# Patient Record
Sex: MALE | Race: WHITE | NOT HISPANIC OR LATINO | URBAN - METROPOLITAN AREA
[De-identification: names, ages, dates, MRNs, and addresses within clinical notes are randomized per-mention and may not be internally consistent; named-entity substitution may affect disease eponyms.]

---

## 2018-09-04 ENCOUNTER — OUTPATIENT (OUTPATIENT)
Dept: OUTPATIENT SERVICES | Facility: HOSPITAL | Age: 58
LOS: 1 days | End: 2018-09-04
Payer: COMMERCIAL

## 2018-09-04 ENCOUNTER — APPOINTMENT (OUTPATIENT)
Dept: MRI IMAGING | Facility: IMAGING CENTER | Age: 58
End: 2018-09-04
Payer: COMMERCIAL

## 2018-09-04 DIAGNOSIS — Z00.8 ENCOUNTER FOR OTHER GENERAL EXAMINATION: ICD-10-CM

## 2018-09-04 PROCEDURE — 73221 MRI JOINT UPR EXTREM W/O DYE: CPT

## 2018-09-04 PROCEDURE — 73221 MRI JOINT UPR EXTREM W/O DYE: CPT | Mod: 26,RT

## 2018-11-01 ENCOUNTER — OUTPATIENT (OUTPATIENT)
Dept: OUTPATIENT SERVICES | Facility: HOSPITAL | Age: 58
LOS: 1 days | End: 2018-11-01
Payer: COMMERCIAL

## 2018-11-01 ENCOUNTER — APPOINTMENT (OUTPATIENT)
Dept: CT IMAGING | Facility: IMAGING CENTER | Age: 58
End: 2018-11-01

## 2018-11-01 DIAGNOSIS — Z00.8 ENCOUNTER FOR OTHER GENERAL EXAMINATION: ICD-10-CM

## 2018-11-01 PROCEDURE — G0297: CPT

## 2018-11-01 PROCEDURE — G0297: CPT | Mod: 26

## 2018-12-05 ENCOUNTER — APPOINTMENT (OUTPATIENT)
Dept: OTOLARYNGOLOGY | Facility: CLINIC | Age: 58
End: 2018-12-05
Payer: COMMERCIAL

## 2018-12-05 VITALS
BODY MASS INDEX: 30.48 KG/M2 | HEIGHT: 72 IN | SYSTOLIC BLOOD PRESSURE: 132 MMHG | WEIGHT: 225 LBS | DIASTOLIC BLOOD PRESSURE: 83 MMHG | HEART RATE: 77 BPM

## 2018-12-05 PROCEDURE — 31231 NASAL ENDOSCOPY DX: CPT

## 2018-12-05 PROCEDURE — G0268 REMOVAL OF IMPACTED WAX MD: CPT

## 2018-12-05 PROCEDURE — 92557 COMPREHENSIVE HEARING TEST: CPT

## 2018-12-05 PROCEDURE — 99204 OFFICE O/P NEW MOD 45 MIN: CPT | Mod: 25

## 2018-12-05 PROCEDURE — 92550 TYMPANOMETRY & REFLEX THRESH: CPT

## 2019-10-21 ENCOUNTER — APPOINTMENT (OUTPATIENT)
Dept: UROLOGY | Facility: CLINIC | Age: 59
End: 2019-10-21
Payer: COMMERCIAL

## 2019-10-21 PROCEDURE — 99244 OFF/OP CNSLTJ NEW/EST MOD 40: CPT

## 2019-10-21 NOTE — ASSESSMENT
[FreeTextEntry1] : 10/21/19: Michael Canela is a 59 y/o male patient who presents today for an initial evaluation. For the past 4-5 years he has been seeing various urologists. He began taking Tamsulosin 0.4 mg BID to improve his urination. He became concerned when the amount of ejaculate decreased and as a result the Tamsulosin was decreased to one capsule per day. Urinary frequency is reported. He states that the stream is strong. Nocturia x 2 is reported. When he wakes up at night he states that the urinary stream is weaker than it is during the day. He believes that his urination is significantly improved since beginning the Tamsulosin. He states that his sexual function is decreased, erections occur less often. He is a  and drinks at least 2 quarts of water a day. His father was diagnosed with prostate cancer in his 70's. His father also had colon cancer. His mother was diagnosed with breast cancer. He denies a Hx and FHx of kidney stones. Currently smokes less than half a pack of cigarettes a day and is attempting to quit.\par \par Digital rectal exam found no suspicious rectal masses. Anal tone is normal. The prostate is non tender with normal texture, discrete borders and no nodules. It is a 30 gram transurethral resection size. \par \par The patient produced a urine sample which will be sent for urinalysis, urine cytology and urine culture. \par Blood work today included comprehensive metabolic panel, CBC, PSA and testosterone. \par \par Tamsulosin 0.4 mg is renewed at this time. He is to continue taking one capsule once a day as directed. \par \par He is to follow up in 6 months or sooner if clinically indicated.

## 2019-10-21 NOTE — REVIEW OF SYSTEMS
[Feeling Tired] : feeling tired [Cough] : cough [Diarrhea] : diarrhea [Feelings Of Weakness] : feelings of weakness [Joint Pain] : joint pain [Negative] : Heme/Lymph [Chest Pain] : no chest pain [Abdominal Pain] : no abdominal pain [Constipation] : no constipation

## 2019-10-21 NOTE — LETTER BODY
[Consult Letter:] : I had the pleasure of evaluating your patient, [unfilled]. [Please see my note below.] : Please see my note below. [Dear  ___] : Dear  [unfilled], [Sincerely,] : Sincerely, [Consult Closing:] : Thank you very much for allowing me to participate in the care of this patient.  If you have any questions, please do not hesitate to contact me. [FreeTextEntry2] : Jonny Abel MD\par [address], \[place] [address] [FreeTextEntry1] : 10/21/19: Michael Canela is a 57 y/o male patient who presents today for an initial evaluation. For the past 4-5 years he has been seeing various urologists. He began taking Tamsulosin 0.4 mg BID to improve his urination. He became concerned when the amount of ejaculate decreased and as a result the Tamsulosin was decreased to one capsule per day. Urinary frequency is reported. He states that the stream is strong. Nocturia x 2 is reported. When he wakes up at night he states that the urinary stream is weaker than it is during the day. He believes that his urination is significantly improved since beginning the Tamsulosin. He states that his sexual function is decreased, erections occur less often. He is a  and drinks at least 2 quarts of water a day. His father was diagnosed with prostate cancer in his 70's. His father also had colon cancer. His mother was diagnosed with breast cancer. He denies a Hx and FHx of kidney stones. Currently smokes less than half a pack of cigarettes a day and is attempting to quit.\par \par Digital rectal exam found no suspicious rectal masses. Anal tone is normal. The prostate is non tender with normal texture, discrete borders and no nodules. It is a 30 gram transurethral resection size. \par \par The patient produced a urine sample which will be sent for urinalysis, urine cytology and urine culture. \par Blood work today included comprehensive metabolic panel, CBC, PSA and testosterone. \par \par Tamsulosin 0.4 mg is renewed at this time. He is to continue taking one capsule once a day as directed. \par \par He is to follow up in 6 months or sooner if clinically indicated.  [FreeTextEntry3] : Chris Leggett M.D. PhD

## 2019-10-21 NOTE — HISTORY OF PRESENT ILLNESS
[FreeTextEntry1] : 10/21/19: Michael Canela is a 57 y/o male patient who presents today for an initial evaluation. For the past 4-5 years he has been seeing various urologists. He began taking Tamsulosin 0.4 mg BID to improve his urination. He became concerned when the amount of ejaculate decreased and as a result the Tamsulosin was decreased to one capsule per day. Urinary frequency is reported. He states that the stream is strong. Nocturia x 2 is reported. When he wakes up at night he states that the urinary stream is weaker than it is during the day. He believes that his urination is significantly improved since beginning the Tamsulosin. He states that his sexual function is decreased, erections occur less often. He is a  and drinks at least 2 quarts of water a day. His father was diagnosed with prostate cancer in his 70's. His father also had colon cancer. His mother was diagnosed with breast cancer. He denies a Hx and FHx of kidney stones. Currently smokes less than half a pack of cigarettes a day and is attempting to quit.

## 2019-10-21 NOTE — ADDENDUM
[FreeTextEntry1] : This note was authored by Shai Christensen working as scribe for Dr. Chris Leggett. I, Dr. Chris Leggett, have reviewed the content of this note and confirm it is true and accurate. I personally performed the history and physical examination and made all the decisions.\par 10/21/19

## 2019-10-21 NOTE — PHYSICAL EXAM
[Normal Appearance] : normal appearance [General Appearance - Well Developed] : well developed [] : no respiratory distress [Oriented To Time, Place, And Person] : oriented to person, place, and time [Normal Station and Gait] : the gait and station were normal for the patient's age [Affect] : the affect was normal [Edema] : no peripheral edema [Heart Rate And Rhythm] : Heart rate and rhythm were normal [Abdomen Tenderness] : non-tender [Abdomen Soft] : soft [Costovertebral Angle Tenderness] : no ~M costovertebral angle tenderness [Skin Color & Pigmentation] : normal skin color and pigmentation [No Palpable Adenopathy] : no palpable adenopathy [No Focal Deficits] : no focal deficits [Cervical Lymph Nodes Enlarged Anterior Bilaterally] : anterior cervical [Cervical Lymph Nodes Enlarged Posterior Bilaterally] : posterior cervical [FreeTextEntry1] : Digital rectal exam found no suspicious rectal masses. Anal tone is normal. The prostate is non tender with normal texture, discrete borders and no nodules. It is a 30 gram transurethral resection size.

## 2019-11-06 LAB
ALBUMIN SERPL ELPH-MCNC: 4.5 G/DL
ALP BLD-CCNC: 88 U/L
ALT SERPL-CCNC: 32 U/L
ANION GAP SERPL CALC-SCNC: 13 MMOL/L
APPEARANCE: CLEAR
AST SERPL-CCNC: 23 U/L
BACTERIA UR CULT: NORMAL
BACTERIA: NEGATIVE
BASOPHILS # BLD AUTO: 0.05 K/UL
BASOPHILS NFR BLD AUTO: 0.9 %
BILIRUB SERPL-MCNC: 0.4 MG/DL
BILIRUBIN URINE: NEGATIVE
BLOOD URINE: NEGATIVE
BUN SERPL-MCNC: 17 MG/DL
CALCIUM SERPL-MCNC: 10 MG/DL
CHLORIDE SERPL-SCNC: 101 MMOL/L
CO2 SERPL-SCNC: 26 MMOL/L
COLOR: YELLOW
CREAT SERPL-MCNC: 0.82 MG/DL
EOSINOPHIL # BLD AUTO: 0.26 K/UL
EOSINOPHIL NFR BLD AUTO: 4.5 %
GLUCOSE QUALITATIVE U: NEGATIVE
GLUCOSE SERPL-MCNC: 100 MG/DL
HCT VFR BLD CALC: 45.5 %
HGB BLD-MCNC: 14.4 G/DL
HYALINE CASTS: 0 /LPF
IMM GRANULOCYTES NFR BLD AUTO: 0.2 %
KETONES URINE: NEGATIVE
LEUKOCYTE ESTERASE URINE: NEGATIVE
LYMPHOCYTES # BLD AUTO: 2.15 K/UL
LYMPHOCYTES NFR BLD AUTO: 37.6 %
MAN DIFF?: NORMAL
MCHC RBC-ENTMCNC: 29.9 PG
MCHC RBC-ENTMCNC: 31.6 GM/DL
MCV RBC AUTO: 94.6 FL
MICROSCOPIC-UA: NORMAL
MONOCYTES # BLD AUTO: 0.41 K/UL
MONOCYTES NFR BLD AUTO: 7.2 %
NEUTROPHILS # BLD AUTO: 2.84 K/UL
NEUTROPHILS NFR BLD AUTO: 49.6 %
NITRITE URINE: NEGATIVE
PH URINE: 6
PLATELET # BLD AUTO: 209 K/UL
POTASSIUM SERPL-SCNC: 5 MMOL/L
PROT SERPL-MCNC: 7 G/DL
PROTEIN URINE: NEGATIVE
PSA SERPL-MCNC: 1.58 NG/ML
RBC # BLD: 4.81 M/UL
RBC # FLD: 12.1 %
RED BLOOD CELLS URINE: 0 /HPF
SODIUM SERPL-SCNC: 140 MMOL/L
SPECIFIC GRAVITY URINE: 1.02
SQUAMOUS EPITHELIAL CELLS: 0 /HPF
TESTOST SERPL-MCNC: 169 NG/DL
TSH SERPL-ACNC: 1.23 UIU/ML
URINE CYTOLOGY: NORMAL
UROBILINOGEN URINE: NORMAL
WBC # FLD AUTO: 5.72 K/UL
WHITE BLOOD CELLS URINE: 0 /HPF

## 2020-02-12 VITALS — BODY MASS INDEX: 31.15 KG/M2 | HEIGHT: 72 IN | WEIGHT: 230 LBS

## 2020-02-12 DIAGNOSIS — Z12.2 ENCOUNTER FOR SCREENING FOR MALIGNANT NEOPLASM OF RESPIRATORY ORGANS: ICD-10-CM

## 2020-02-12 NOTE — HISTORY OF PRESENT ILLNESS
[TextBox_13] : He denies hemoptysis, denies new cough, denies unexplained weight loss.  He is a current smoker with a 30 pack year smoking history (0.8PPD x 38 years).  He is interested in support in quitting.  He denies a family h/o lung cancer.  He is referred by Dr. Abel.  This is a virtual visit, done via telephone.  [Current] : current smoker

## 2020-02-12 NOTE — PLAN
[Smoking Cessation Guidance Provided] : Smoking cessation guidance was provided to patient [[facility]] : referred to [facility] [phone] [Age appropriate screenings] : Age appropriate screenings [Regular Exercise] : regular exercise [Smoking Cessation] : smoking cessation [Regular follow-up with healthcare provider] : regular follow-up with healthcare provider [FreeTextEntry1] : His LDCT is scheduled for 2/18 at the [facility] location.

## 2020-02-12 NOTE — ASSESSMENT
[Discussed Risks and Advised to Quit Smoking] : Discussed risks and advised to quit smoking [Ready] : Patient is ready for cessation intervention [Discussed Cessation Medication] : cessation medication was discussed [Discussed Cessation Strategies] : cessation strategies were discussed

## 2020-02-12 NOTE — REASON FOR VISIT
[Annual Follow-Up] : an annual follow-up visit [Review of Eligibility] : review of eligibility [Virtual Visit] : virtual visit [Low-Dose CT Screening Discussion] : low-dose CT lung cancer screening discussion

## 2020-02-18 ENCOUNTER — APPOINTMENT (OUTPATIENT)
Dept: CT IMAGING | Facility: IMAGING CENTER | Age: 60
End: 2020-02-18
Payer: COMMERCIAL

## 2020-02-18 ENCOUNTER — OUTPATIENT (OUTPATIENT)
Dept: OUTPATIENT SERVICES | Facility: HOSPITAL | Age: 60
LOS: 1 days | End: 2020-02-18
Payer: COMMERCIAL

## 2020-02-18 DIAGNOSIS — Z00.8 ENCOUNTER FOR OTHER GENERAL EXAMINATION: ICD-10-CM

## 2020-02-18 PROCEDURE — G0297: CPT

## 2020-02-18 PROCEDURE — G0297: CPT | Mod: 26

## 2020-09-25 RX ORDER — TAMSULOSIN HYDROCHLORIDE 0.4 MG/1
0.4 CAPSULE ORAL
Refills: 0 | Status: COMPLETED | COMMUNITY
End: 2020-09-25

## 2020-09-29 ENCOUNTER — APPOINTMENT (OUTPATIENT)
Dept: UROLOGY | Facility: CLINIC | Age: 60
End: 2020-09-29
Payer: COMMERCIAL

## 2020-09-29 VITALS
HEIGHT: 72 IN | WEIGHT: 230 LBS | SYSTOLIC BLOOD PRESSURE: 130 MMHG | DIASTOLIC BLOOD PRESSURE: 76 MMHG | BODY MASS INDEX: 31.15 KG/M2 | HEART RATE: 80 BPM | RESPIRATION RATE: 18 BRPM

## 2020-09-29 VITALS — TEMPERATURE: 97.1 F

## 2020-09-29 PROCEDURE — 99214 OFFICE O/P EST MOD 30 MIN: CPT

## 2020-09-29 NOTE — HISTORY OF PRESENT ILLNESS
[Urinary Retention] : urinary retention [Urinary Urgency] : urinary urgency [Urinary Frequency] : urinary frequency [Nocturia] : nocturia [Straining] : straining [Weak Stream] : weak stream [Intermittency] : intermittency [Dysuria] : dysuria [Hematuria - Microscopic] : microscopic hematuria [FreeTextEntry1] : 59 yr male history of LUTS for 6 yrs going to urology.  Present buring , freq, fever , pain at tip of penis, supra pubic pressure started one week.  Fever resolved with abx cefalexin from urgent care.  Freq and buring is resolving. Freq down to 2 hrly from q 45 min.  Anxious .  .  PSA 1.58  as at 10/19  [Urinary Incontinence] : no urinary incontinence

## 2020-09-29 NOTE — REVIEW OF SYSTEMS
[Fever] : fever [Chills] : chills [Feeling Poorly] : feeling poorly [Dysuria] : dysuria [Nocturia] : nocturia [Genital Lesion] : no genital lesions

## 2020-09-29 NOTE — ASSESSMENT
[Urinary Symptom or Sign (788.99\R39.89)] : implantation [Urinary Tract Infection (599.0\N39.0)] : qualitative ~C was positive [FreeTextEntry1] : 59 yr male with LUTS presents dysuria ,  freq and recent fever and chill, resolving with abx cefalexin \par \par \par Change abx to levaquin 750mg po qd 10 days \par Pyridium 200mg tid # 30\par Rapoflo 8mg po daily 30 day 3 refills \par \par Follow up in 2 week\par Letter for off duty 2 weeks

## 2020-09-29 NOTE — PHYSICAL EXAM
[Abdomen Tenderness] : non-tender [Urethral Meatus] : meatus normal [Penis Abnormality] : normal circumcised penis [Epididymis] : the epididymides were normal [Testes Mass (___cm)] : there were no testicular masses [Edema] : no peripheral edema [] : no respiratory distress [Oriented To Time, Place, And Person] : oriented to person, place, and time [No Focal Deficits] : no focal deficits [General Appearance - Well Developed] : well developed [Normal Appearance] : normal appearance [FreeTextEntry1] : distress and anxious

## 2020-09-30 LAB
APPEARANCE: CLEAR
BACTERIA: NEGATIVE
BILIRUBIN URINE: NEGATIVE
BLOOD URINE: NEGATIVE
COLOR: YELLOW
GLUCOSE QUALITATIVE U: NEGATIVE
HYALINE CASTS: 0 /LPF
KETONES URINE: NEGATIVE
LEUKOCYTE ESTERASE URINE: NEGATIVE
MICROSCOPIC-UA: NORMAL
NITRITE URINE: NEGATIVE
PH URINE: 6
PROTEIN URINE: NEGATIVE
RED BLOOD CELLS URINE: 0 /HPF
SPECIFIC GRAVITY URINE: 1.02
SQUAMOUS EPITHELIAL CELLS: 0 /HPF
UROBILINOGEN URINE: NORMAL
WHITE BLOOD CELLS URINE: 2 /HPF

## 2020-10-01 LAB
BACTERIA UR CULT: NORMAL
URINE CYTOLOGY: NORMAL

## 2020-10-07 ENCOUNTER — APPOINTMENT (OUTPATIENT)
Dept: UROLOGY | Facility: CLINIC | Age: 60
End: 2020-10-07
Payer: COMMERCIAL

## 2020-10-07 VITALS — TEMPERATURE: 97.3 F

## 2020-10-07 DIAGNOSIS — Z86.39 PERSONAL HISTORY OF OTHER ENDOCRINE, NUTRITIONAL AND METABOLIC DISEASE: ICD-10-CM

## 2020-10-07 DIAGNOSIS — Z87.440 PERSONAL HISTORY OF URINARY (TRACT) INFECTIONS: ICD-10-CM

## 2020-10-07 DIAGNOSIS — R39.9 UNSPECIFIED SYMPTOMS AND SIGNS INVOLVING THE GENITOURINARY SYSTEM: ICD-10-CM

## 2020-10-07 PROCEDURE — 99214 OFFICE O/P EST MOD 30 MIN: CPT

## 2020-10-07 RX ORDER — CEPHALEXIN 500 MG/1
500 TABLET ORAL 3 TIMES DAILY
Qty: 30 | Refills: 0 | Status: COMPLETED | COMMUNITY
Start: 2020-09-25 | End: 2020-10-07

## 2020-10-11 LAB
APPEARANCE: CLEAR
BACTERIA: NEGATIVE
BILIRUBIN URINE: NEGATIVE
BLOOD URINE: NEGATIVE
COLOR: COLORLESS
GLUCOSE QUALITATIVE U: NEGATIVE
HYALINE CASTS: 0 /LPF
KETONES URINE: NEGATIVE
LEUKOCYTE ESTERASE URINE: NEGATIVE
MICROSCOPIC-UA: NORMAL
NITRITE URINE: NEGATIVE
PH URINE: 6.5
PROTEIN URINE: NEGATIVE
PSA FREE FLD-MCNC: 7 %
PSA FREE SERPL-MCNC: 0.62 NG/ML
PSA SERPL-MCNC: 8.29 NG/ML
RED BLOOD CELLS URINE: 0 /HPF
SPECIFIC GRAVITY URINE: 1
SQUAMOUS EPITHELIAL CELLS: 0 /HPF
UROBILINOGEN URINE: NORMAL
WHITE BLOOD CELLS URINE: 0 /HPF

## 2020-10-12 ENCOUNTER — APPOINTMENT (OUTPATIENT)
Dept: UROLOGY | Facility: CLINIC | Age: 60
End: 2020-10-12

## 2020-10-12 ENCOUNTER — RECORD ABSTRACTING (OUTPATIENT)
Age: 60
End: 2020-10-12

## 2020-10-14 LAB
TESTOST BND SERPL-MCNC: 10.8 PG/ML
TESTOST SERPL-MCNC: 278.1 NG/DL

## 2020-10-17 PROBLEM — R39.9 URINARY SYMPTOM OR SIGN: Status: ACTIVE | Noted: 2020-09-29

## 2020-10-17 PROBLEM — Z86.39 HISTORY OF HIGH CHOLESTEROL: Status: RESOLVED | Noted: 2018-12-05 | Resolved: 2020-10-17

## 2020-10-17 PROBLEM — R39.9 UTI SYMPTOMS: Status: ACTIVE | Noted: 2019-10-21

## 2020-10-17 NOTE — REVIEW OF SYSTEMS
[Fever] : no fever [Chills] : no chills [Shortness Of Breath] : no shortness of breath [Cough] : no cough [Abdominal Pain] : no abdominal pain [Dysuria] : no dysuria [Incontinence] : no incontinence [Hesitancy] : no urinary hesitancy [Nocturia] : no nocturia

## 2020-10-17 NOTE — ADDENDUM
[FreeTextEntry1] : I, Maxine Shieldsin, acted solely as a scribe for Dr. Chris Leggett on this date 10/07/2020.\par \par All medical record entries made by the Scribe were at my, Dr. Chris Leggett, direction and personally dictated by me on 10/07/2020. I have reviewed the chart and agree that the record accurately reflects my personal performance of the history, physical exam, assessment and plan.  I have also personally directed, reviewed and agreed with the chart.

## 2020-10-17 NOTE — ASSESSMENT
[Urinary Symptom or Sign (788.99\R39.89)] : implantation [Urinary Tract Infection (599.0\N39.0)] : qualitative ~C was positive [FreeTextEntry1] : 59 yr male Acute LUTs possible uti , resolved with levaquin, Doxazosin \par \par to repeat ua micros\par \par PSA afte MORENA. \par \par Knee chest position was used. Digital rectal exam found no suspicious rectal masses. No rectal mucosal lesions. Anal tone is normal. The prostate is non tender, with normal texture, discrete borders, and no nodules. It is a 45 gram transurethral resection size. No gross blood on examining fingers. \par \par PVR 348ml after urination\par  after repeat urination. \par \par Advised the patient to routinely double void. \par \par Patient will continue taking Tamsulosin 0.4mg BID and will call office in 2 weeks to report status of urination.\par \par RTO in 3 months for follow up.

## 2020-10-17 NOTE — HISTORY OF PRESENT ILLNESS
[Urinary Incontinence] : no urinary incontinence [Urinary Retention] : no urinary retention [Urinary Urgency] : no urinary urgency [Urinary Frequency] : no urinary frequency [Nocturia] : no nocturia [Erectile Dysfunction] : no Erectile Dysfunction [Dysuria] : no dysuria [FreeTextEntry1] : 59 yr male history of dysuria. see other note of same date.

## 2020-10-17 NOTE — PHYSICAL EXAM
[General Appearance - Well Nourished] : well nourished [General Appearance - In No Acute Distress] : no acute distress [Abdomen Tenderness] : non-tender [Skin Color & Pigmentation] : normal skin color and pigmentation [Heart Rate And Rhythm] : Heart rate and rhythm were normal [Edema] : no peripheral edema [] : no respiratory distress [Oriented To Time, Place, And Person] : oriented to person, place, and time [No Focal Deficits] : no focal deficits [FreeTextEntry1] : anxious

## 2020-10-19 ENCOUNTER — TRANSCRIPTION ENCOUNTER (OUTPATIENT)
Age: 60
End: 2020-10-19

## 2020-10-20 LAB
PSA FREE FLD-MCNC: 10 %
PSA FREE SERPL-MCNC: 0.39 NG/ML
PSA SERPL-MCNC: 3.79 NG/ML

## 2020-10-23 ENCOUNTER — NON-APPOINTMENT (OUTPATIENT)
Age: 60
End: 2020-10-23

## 2020-12-23 PROBLEM — Z87.440 HISTORY OF URINARY TRACT INFECTION: Status: RESOLVED | Noted: 2020-09-29 | Resolved: 2020-12-23

## 2021-01-20 ENCOUNTER — APPOINTMENT (OUTPATIENT)
Dept: UROLOGY | Facility: CLINIC | Age: 61
End: 2021-01-20

## 2021-02-09 ENCOUNTER — APPOINTMENT (OUTPATIENT)
Dept: UROLOGY | Facility: CLINIC | Age: 61
End: 2021-02-09
Payer: COMMERCIAL

## 2021-02-09 VITALS
WEIGHT: 230 LBS | HEART RATE: 82 BPM | RESPIRATION RATE: 18 BRPM | SYSTOLIC BLOOD PRESSURE: 150 MMHG | BODY MASS INDEX: 31.15 KG/M2 | HEIGHT: 72 IN | DIASTOLIC BLOOD PRESSURE: 79 MMHG | TEMPERATURE: 96.3 F

## 2021-02-09 PROCEDURE — 99215 OFFICE O/P EST HI 40 MIN: CPT

## 2021-02-09 PROCEDURE — 99072 ADDL SUPL MATRL&STAF TM PHE: CPT

## 2021-02-09 RX ORDER — TAMSULOSIN HYDROCHLORIDE 0.4 MG/1
0.4 CAPSULE ORAL
Qty: 180 | Refills: 2 | Status: COMPLETED | COMMUNITY
Start: 2019-10-21 | End: 2021-02-09

## 2021-02-09 RX ORDER — SILODOSIN 8 MG/1
8 CAPSULE ORAL DAILY
Qty: 30 | Refills: 3 | Status: COMPLETED | COMMUNITY
Start: 2020-09-29 | End: 2021-02-09

## 2021-02-15 LAB
ANDROST SERPL-MCNC: 91 NG/DL
ANDROSTANOLONE SERPL-MCNC: 26 NG/DL
ANION GAP SERPL CALC-SCNC: 17 MMOL/L
APPEARANCE: CLEAR
BACTERIA UR CULT: NORMAL
BACTERIA: NEGATIVE
BILIRUBIN URINE: NEGATIVE
BLOOD URINE: NEGATIVE
BUN SERPL-MCNC: 17 MG/DL
CALCIUM SERPL-MCNC: 10.3 MG/DL
CHLORIDE SERPL-SCNC: 102 MMOL/L
CO2 SERPL-SCNC: 21 MMOL/L
COLOR: NORMAL
CREAT SERPL-MCNC: 0.9 MG/DL
DHEA-S SERPL-MCNC: 137 UG/DL
ESTRADIOL SERPL-MCNC: 24 PG/ML
ESTROGEN SERPL-MCNC: 95 PG/ML
FSH SERPL-MCNC: 4.4 IU/L
GLUCOSE QUALITATIVE U: NEGATIVE
GLUCOSE SERPL-MCNC: 103 MG/DL
HYALINE CASTS: 0 /LPF
KETONES URINE: NEGATIVE
LEUKOCYTE ESTERASE URINE: NEGATIVE
LH SERPL-ACNC: 4.5 IU/L
MICROSCOPIC-UA: NORMAL
NITRITE URINE: NEGATIVE
PH URINE: 6
POTASSIUM SERPL-SCNC: 4.5 MMOL/L
PROGEST SERPL-MCNC: 0.2 NG/ML
PROLACTIN SERPL-MCNC: 14.9 NG/ML
PROTEIN URINE: NEGATIVE
PSA FREE FLD-MCNC: 15 %
PSA FREE SERPL-MCNC: 0.35 NG/ML
PSA SERPL-MCNC: 2.29 NG/ML
RED BLOOD CELLS URINE: 0 /HPF
SODIUM SERPL-SCNC: 140 MMOL/L
SPECIFIC GRAVITY URINE: 1.01
SQUAMOUS EPITHELIAL CELLS: 0 /HPF
TESTOST BND SERPL-MCNC: 11.9 NG/DL
TESTOST BND SERPL-MCNC: 9 PG/ML
TESTOST SERPL-MCNC: 357.4 NG/DL
TESTOSTERONE BIOAVAILABLE: 140 NG/DL
TESTOSTERONE TOTAL S: 425 NG/DL
TSH SERPL-ACNC: 1.51 UIU/ML
URINE CYTOLOGY: NORMAL
UROBILINOGEN URINE: NORMAL
WHITE BLOOD CELLS URINE: 0 /HPF

## 2021-02-17 LAB — DHEA SERPL-MCNC: 77 NG/DL

## 2021-02-20 NOTE — HISTORY OF PRESENT ILLNESS
[FreeTextEntry1] : 10/7/2020: 59 yr male with acute  LUTs symptoms concerning for UTI ,now resolved after Levofloxacin 750mg take for 7 days and feels well today.  Doxazosin. Takes Tamsulosin 0.4mg BID. \par Said pressure and burning and pain tip of penis resolved.  Wakes 2X nocte to pee.  Freq during the day .  Feels like do not empty bladder and feels urgency in the morning hours, but stream is good and no leakage. Feels increased urgency when driving. Pt concerned about low testosterone. Erections are adequate. \par UA/Cx sept 29 show now bacterial growth.  Unremarkable \par PSA  1.58 oct 2019 \par +FHx of bladder stone in father. \par \par 02/09/2021: The patient presents today for a follow up. Patient appears concerned and very anxious although reports he does not usually get like this.Since his infection, he doesn't feel like he used to and reports feeling overall different which has been overwhelming. He used to wake up at night feeling an urge but now doesn't. Voids about 8-9x a day. Still has urinary frequency but denies urinary urgency. He reports drinking an adequate amount of liquids. Reports his father had prostate ca. His father had radiation seeds at 78 y/o that broke up and would cause blockages, causing his father a lot of pain, burning, and infections. Patient seems worried about going through the same that his father went through. The patient discontinued Pyridium 200 mg 1 tablet 3x a day after meals for burning. He was taking tamsulosin 0.4 mg 2x daily, however he went down to 1 after having ejaculation contraction problems. Before the pandemic he reports being sexually active. His libido is still good, however he reports that his erections are not as adequate as he'd like. He has high blood pressure and cholesterol. He works as a  for the [employer]. His last PSA was 3.79 on 10/20/2020 which is acceptable to watch. PVR is 171.  [Urinary Incontinence] : no urinary incontinence [Urinary Retention] : no urinary retention [Urinary Urgency] : no urinary urgency [Urinary Frequency] : no urinary frequency [Nocturia] : no nocturia [Erectile Dysfunction] : no Erectile Dysfunction [Dysuria] : no dysuria

## 2021-02-20 NOTE — ASSESSMENT
[FreeTextEntry1] : 59 yr male Acute LUTs possible uti , resolved with levaquin, Doxazosin \par to repeat ua micros\par PSA afte MORENA. \par Knee chest position was used. Digital rectal exam found no suspicious rectal masses. No rectal mucosal lesions. Anal tone is normal. The prostate is non tender, with normal texture, discrete borders, and no nodules. It is a 45 gram transurethral resection size. No gross blood on examining fingers. \par PVR 348ml after urination.  after repeat urination. \par Advised the patient to routinely double void. \par Patient will continue taking Tamsulosin 0.4mg BID and will call office in 2 weeks to report status of urination.\par RTO in 3 months for follow up. \par \par 02/09/2021: The patient presents today for a follow up. Patient appears concerned and very anxious although reports he does not usually get like this.Since his infection, he doesn't feel like he used to and reports feeling overall different which has been overwhelming. He used to wake up at night feeling an urge but now doesn't. Voids about 8-9x a day. Still has urinary frequency but denies urinary urgency. He reports drinking an adequate amount of liquids. Reports his father had prostate ca. His father had radiation seeds at 76 y/o that broke up and would cause blockages, causing his father a lot of pain, burning, and infections. Patient seems worried about going through the same that his father went through. The patient discontinued Pyridium 200 mg 1 tablet 3x a day after meals for burning. He was taking tamsulosin 0.4 mg 2x daily, however he went down to 1 after having ejaculation contraction problems. Before the pandemic he reports being sexually active. His libido is still good, however he reports that his erections are not as adequate as he'd like. He has high blood pressure and cholesterol. He works as a  for the [employer]. His last PSA was 3.79 on 10/20/2020 which is acceptable to watch. PVR is 171. \par \par The patient produced a urine sample which will be sent for urinalysis, urine cytology, and urine culture. \par \par Blood work today included BMP, PSA, and testosterone.\par \par I prescribed the patient Alfuzosin ER 10 mg, once daily after a meal. I informed the patient of lightheadedness as a side effect. The patient will discontinue tamsulosin. \par \par I am prescribing the patient tadalafil 5 mg once daily. I informed him he should expect to see improvement in about one week.I informed him of the possible side effects of heart burn, tingling feeling on the skin, back ache, and on rare occasion visual and auditory problems.  \par \par Patient will RTC in 1 month for a follow up.  \par \par Preparation, in person office visit, and coordination of care: 45 minutes.  Pt has 2 steps to get into house w/o rail and one flight of stairs c L rail to go up to the 2nd floor.

## 2021-02-20 NOTE — ADDENDUM
[FreeTextEntry1] : This note was authored by Kalpana Herrera working as a scribe for Dr.Gary Leggett. I, Dr. Chris Leggett have reviewed the content of this note and confirm it is true and accurate. I personally performed the history and physical examination and made all the decisions 02/09/2021.

## 2021-02-20 NOTE — REVIEW OF SYSTEMS
[Nocturia] : nocturia [Anxiety] : anxiety [Erectile Dysfunction] : erectile dysfunction [Feeling Poorly] : not feeling poorly [Difficulty Walking] : no difficulty walking

## 2021-02-20 NOTE — PHYSICAL EXAM
[General Appearance - Well Developed] : well developed [Normal Appearance] : normal appearance [General Appearance - In No Acute Distress] : no acute distress [Abdomen Soft] : soft [Abdomen Tenderness] : non-tender [Skin Color & Pigmentation] : normal skin color and pigmentation [Edema] : no peripheral edema [] : no respiratory distress [Respiration, Rhythm And Depth] : normal respiratory rhythm and effort [Exaggerated Use Of Accessory Muscles For Inspiration] : no accessory muscle use [Oriented To Time, Place, And Person] : oriented to person, place, and time [Normal Station and Gait] : the gait and station were normal for the patient's age [No Focal Deficits] : no focal deficits [FreeTextEntry1] : very anxious

## 2021-03-29 RX ORDER — LEVOFLOXACIN 750 MG/1
750 TABLET, FILM COATED ORAL DAILY
Qty: 7 | Refills: 0 | Status: DISCONTINUED | COMMUNITY
Start: 2020-09-29 | End: 2021-03-29

## 2021-03-29 RX ORDER — PHENAZOPYRIDINE HYDROCHLORIDE 200 MG/1
200 TABLET ORAL 3 TIMES DAILY
Qty: 30 | Refills: 0 | Status: DISCONTINUED | COMMUNITY
Start: 2020-09-29 | End: 2021-03-29

## 2021-03-29 RX ORDER — PHENAZOPYRIDINE HYDROCHLORIDE 200 MG/1
200 TABLET ORAL 3 TIMES DAILY
Qty: 30 | Refills: 3 | Status: DISCONTINUED | COMMUNITY
Start: 2020-09-25 | End: 2021-03-29

## 2021-04-22 ENCOUNTER — APPOINTMENT (OUTPATIENT)
Dept: UROLOGY | Facility: CLINIC | Age: 61
End: 2021-04-22
Payer: COMMERCIAL

## 2021-04-22 PROCEDURE — 99442: CPT

## 2021-04-22 PROCEDURE — 99214 OFFICE O/P EST MOD 30 MIN: CPT

## 2021-04-22 PROCEDURE — 99072 ADDL SUPL MATRL&STAF TM PHE: CPT

## 2021-04-22 RX ORDER — DOXAZOSIN 2 MG/1
2 TABLET ORAL AT BEDTIME
Qty: 30 | Refills: 11 | Status: COMPLETED | COMMUNITY
Start: 2020-09-29 | End: 2021-04-22

## 2021-04-22 RX ORDER — ALFUZOSIN HYDROCHLORIDE 10 MG/1
10 TABLET, EXTENDED RELEASE ORAL
Qty: 30 | Refills: 11 | Status: COMPLETED | COMMUNITY
Start: 2021-02-09 | End: 2021-04-22

## 2021-05-01 NOTE — ASSESSMENT
[FreeTextEntry1] : 10/21/19: Michael Canela is a 57 y/o male patient who presents today for an initial evaluation. For the past 4-5 years he has been seeing various urologists. He began taking Tamsulosin 0.4 mg BID to improve his urination. He became concerned when the amount of ejaculate decreased and as a result the Tamsulosin was decreased to one capsule per day. Urinary frequency is reported. He states that the stream is strong. Nocturia x 2 is reported. When he wakes up at night he states that the urinary stream is weaker than it is during the day. He believes that his urination is significantly improved since beginning the Tamsulosin. He states that his sexual function is decreased, erections occur less often. He is a  and drinks at least 2 quarts of water a day. His father was diagnosed with prostate cancer in his 70's. His father also had colon cancer. His mother was diagnosed with breast cancer. He denies a Hx and FHx of kidney stones. Currently smokes less than half a pack of cigarettes a day and is attempting to quit. \par \par 09/29/2020: 59 yr male with LUTS presents dysuria , freq and recent fever and chill, resolving with abx cefalexin \par \par \par 10/07/2020: 59 yr male Acute LUTs possible uti , resolved with levaquin, Doxazosin \par to repeat ua micros\par PSA afte MORENA. \par Knee chest position was used. Digital rectal exam found no suspicious rectal masses. No rectal mucosal lesions. Anal tone is normal. The prostate is non tender, with normal texture, discrete borders, and no nodules. It is a 45 gram transurethral resection size. No gross blood on examining fingers. \par PVR 348ml after urination.  after repeat urination. \par Advised the patient to routinely double void. \par Patient will continue taking Tamsulosin 0.4mg BID and will call office in 2 weeks to report status of urination.\par RTO in 3 months for follow up. \par \par 02/09/2021: The patient presents today for a follow up. Patient appears concerned and very anxious although reports he does not usually get like this.Since his infection, he doesn't feel like he used to and reports feeling overall different which has been overwhelming. He used to wake up at night feeling an urge but now doesn't. Voids about 8-9x a day. Still has urinary frequency but denies urinary urgency. He reports drinking an adequate amount of liquids. Reports his father had prostate ca. His father had radiation seeds at 78 y/o that broke up and would cause blockages, causing his father a lot of pain, burning, and infections. Patient seems worried about going through the same that his father went through. The patient discontinued Pyridium 200 mg 1 tablet 3x a day after meals for burning. He was taking tamsulosin 0.4 mg 2x daily, however he went down to 1 after having ejaculation contraction problems. Before the pandemic he reports being sexually active. His libido is still good, however he reports that his erections are not as adequate as he'd like. He has high blood pressure and cholesterol. He works as a  for the [employer]. His last PSA was 3.79 on 10/20/2020 which is acceptable to watch. PVR is 171. \par

## 2021-05-01 NOTE — ADDENDUM
[FreeTextEntry1] : I, Maxine Shieldsin, acted solely as a scribe for Dr. Chris Leggett on this date 04/22/2021.\par \par All medical record entries made by the Scribe were at my, Dr. Chris Leggett, direction and personally dictated by me on 04/22/2021. I have reviewed the chart and agree that the record accurately reflects my personal performance of the history, physical exam, assessment and plan.  I have also personally directed, reviewed and agreed with the chart.

## 2021-05-01 NOTE — HISTORY OF PRESENT ILLNESS
[FreeTextEntry1] : 10/21/19: Michael Canela is a 59 y/o male patient who presents today for an initial evaluation. For the past 4-5 years he has been seeing various urologists. He began taking Tamsulosin 0.4 mg BID to improve his urination. He became concerned when the amount of ejaculate decreased and as a result the Tamsulosin was decreased to one capsule per day. Urinary frequency is reported. He states that the stream is strong. Nocturia x 2 is reported. When he wakes up at night he states that the urinary stream is weaker than it is during the day. He believes that his urination is significantly improved since beginning the Tamsulosin. He states that his sexual function is decreased, erections occur less often. He is a  and drinks at least 2 quarts of water a day. His father was diagnosed with prostate cancer in his 70's. His father also had colon cancer. His mother was diagnosed with breast cancer. He denies a Hx and FHx of kidney stones. Currently smokes less than half a pack of cigarettes a day and is attempting to quit. \par \par 09/29/2020: 59 yr male history of LUTS for 6 yrs going to urology. Present buring , freq, fever , pain at tip of penis, supra pubic pressure started one week. Fever resolved with abx cefalexin from urgent care. Freq and buring is resolving. Freq down to 2 hrly from q 45 min. Anxious .  . PSA 1.58 as at 10/19 \par \par 10/7/2020: 59 yr male with acute  LUTs symptoms concerning for UTI ,now resolved after Levofloxacin 750mg take for 7 days and feels well today.  Doxazosin. Takes Tamsulosin 0.4mg BID. \par Said pressure and burning and pain tip of penis resolved.  Wakes 2X nocte to pee.  Freq during the day .  Feels like do not empty bladder and feels urgency in the morning hours, but stream is good and no leakage. Feels increased urgency when driving. Pt concerned about low testosterone. Erections are adequate. \par UA/Cx sept 29 show now bacterial growth.  Unremarkable \par PSA  1.58 oct 2019 \par +FHx of bladder stone in father. \par \par 02/09/2021: The patient presents today for a follow up. Patient appears concerned and very anxious although reports he does not usually get like this.Since his infection, he doesn't feel like he used to and reports feeling overall different which has been overwhelming. He used to wake up at night feeling an urge but now doesn't. Voids about 8-9x a day. Still has urinary frequency but denies urinary urgency. He reports drinking an adequate amount of liquids. Reports his father had prostate ca. His father had radiation seeds at 78 y/o that broke up and would cause blockages, causing his father a lot of pain, burning, and infections. Patient seems worried about going through the same that his father went through. The patient discontinued Pyridium 200 mg 1 tablet 3x a day after meals for burning. He was taking tamsulosin 0.4 mg 2x daily, however he went down to 1 after having ejaculation contraction problems. Before the pandemic he reports being sexually active. His libido is still good, however he reports that his erections are not as adequate as he'd like. He has high blood pressure and cholesterol. He works as a  for the [employer]. His last PSA was 3.79 on 10/20/2020 which is acceptable to watch. PVR is 171. \par \par 04/22/2021: Patient was not seen in office today.

## 2021-06-11 ENCOUNTER — APPOINTMENT (OUTPATIENT)
Dept: UROLOGY | Facility: CLINIC | Age: 61
End: 2021-06-11
Payer: COMMERCIAL

## 2021-06-11 PROCEDURE — 99215 OFFICE O/P EST HI 40 MIN: CPT | Mod: 95

## 2021-06-11 NOTE — PHYSICAL EXAM
[General Appearance - Well Developed] : well developed [General Appearance - Well Nourished] : well nourished [Normal Appearance] : normal appearance [Well Groomed] : well groomed [General Appearance - In No Acute Distress] : no acute distress [Skin Color & Pigmentation] : normal skin color and pigmentation [] : no respiratory distress [Respiration, Rhythm And Depth] : normal respiratory rhythm and effort [Exaggerated Use Of Accessory Muscles For Inspiration] : no accessory muscle use [Oriented To Time, Place, And Person] : oriented to person, place, and time [Affect] : the affect was normal [Mood] : the mood was normal [Not Anxious] : not anxious [No Focal Deficits] : no focal deficits

## 2021-06-12 NOTE — HISTORY OF PRESENT ILLNESS
[Other Location: e.g. School (Enter Location, City,State)___] : at [unfilled], at the time of the visit. [Medical Office: ([facility])___] : at the medical office located in  [Verbal consent obtained from patient] : the patient, [unfilled] [FreeTextEntry1] : 10/21/19: Michael Canela is a 57 y/o male patient who presents today for an initial evaluation. For the past 4-5 years he has been seeing various urologists. He began taking Tamsulosin 0.4 mg BID to improve his urination. He became concerned when the amount of ejaculate decreased and as a result the Tamsulosin was decreased to one capsule per day. Urinary frequency is reported. He states that the stream is strong. Nocturia x 2 is reported. When he wakes up at night he states that the urinary stream is weaker than it is during the day. He believes that his urination is significantly improved since beginning the Tamsulosin. He states that his sexual function is decreased, erections occur less often. He is a  and drinks at least 2 quarts of water a day. His father was diagnosed with prostate cancer in his 70's. His father also had colon cancer. His mother was diagnosed with breast cancer. He denies a Hx and FHx of kidney stones. Currently smokes less than half a pack of cigarettes a day and is attempting to quit. \par \par 2020: 59 yr male history of LUTS for 6 yrs going to urology. Present buring , freq, fever , pain at tip of penis, supra pubic pressure started one week. Fever resolved with abx cefalexin from urgent care. Freq and buring is resolving. Freq down to 2 hrly from q 45 min. Anxious .  . PSA 1.58 as at 10/19 \par \par 10/7/2020: 59 yr male with acute  LUTs symptoms concerning for UTI ,now resolved after Levofloxacin 750mg take for 7 days and feels well today.  Doxazosin. Takes Tamsulosin 0.4mg BID. \par Said pressure and burning and pain tip of penis resolved.  Wakes 2X nocte to pee.  Freq during the day .  Feels like do not empty bladder and feels urgency in the morning hours, but stream is good and no leakage. Feels increased urgency when driving. Pt concerned about low testosterone. Erections are adequate. \par UA/Cx  show now bacterial growth.  Unremarkable \par PSA  1.58 oct 2019 \par +FHx of bladder stone in father. \par \par 2021: The patient presents today for a follow up. Patient appears concerned and very anxious although reports he does not usually get like this.Since his infection, he doesn't feel like he used to and reports feeling overall different which has been overwhelming. He used to wake up at night feeling an urge but now doesn't. Voids about 8-9x a day. Still has urinary frequency but denies urinary urgency. He reports drinking an adequate amount of liquids. Reports his father had prostate ca. His father had radiation seeds at 78 y/o that broke up and would cause blockages, causing his father a lot of pain, burning, and infections. Patient seems worried about going through the same that his father went through. The patient discontinued Pyridium 200 mg 1 tablet 3x a day after meals for burning. He was taking tamsulosin 0.4 mg 2x daily, however he went down to 1 after having ejaculation contraction problems. Before the pandemic he reports being sexually active. His libido is still good, however he reports that his erections are not as adequate as he'd like. He has high blood pressure and cholesterol. He works as a  for the [employer]. His last PSA was 3.79 on 10/20/2020 which is acceptable to watch. PVR is 171. \par \par 2021: Patient was not seen in office today. \par \par 2021: Patient presents today for a telehealth visit for which he gave permission for. The patient was anxious today and apologized if he was asking questions that were detailed but he was very concerned since his father  at age 91, had pros ca and radioactive seed implants at 78 y/o and had many complications in terms of seeds entering the urethra and bladder causing pain, difficulties, and urinary symptoms. The patient said the seeds had broken up. The patient has been on tamsulosin once daily. He finds it optimal in helping some of his urinary symptoms, however he still does have a lot of symptoms. He had a UTI during my care and since then he no longer has the same sensation when he needs to urinate. He has a sense but no urge specifically at night. He finds that his stream sprays. He is going more frequently, every 2-3 hours which is bothersome to him. He reports his stream is weaker at night since the UTI. He was distressed that I wasn’t ordering an MRI of the prostate for him, however I explained that I would write for one but he would not meet the insurance criteria so it most likely would not be authorized and that the current price was about 800$. Much of what he wanted an MRI for was not for detection of prostate cancer but he wanted a better understanding of anatomical problems. I agreed with him that an MRI would provide a better estimation of size and a better knowledge of the prostae than a regular digital rectal exam would, however I informed him that we can get valuable information as well from a transrectal US in  terms of size and configuration. I also informed him that we can get information about configuration from cystoscopy as well . The patient was agreeable that he'd rather get the transrectal US before allotting for an MRI. His current copay for a telehealth visit is about 50$ and he informed me that he was looking not to come in or have a telehealth visit due to expenses involved since his financial means are limited at this current time. He'd be interested in an in person visit in October and would do the transrectal US at that time. I also asked him about blood work. I offered to put in the computer to do ahead of time but he was content on doing it the same day he came in. I talked about procedures to improve urination if medication management failed. I said since we are having difficulties with medical management of symptoms that we could do urodynamics and cystoscopy to better understand the anatomy and physiology relative to his difficulties. I explained the procedure in detail. Review risks in addition to the benefits. I informed him of possible risk of infection and discomfort. Reviewed procedures for improving urination by decreasing bladder outlet obstruction due to BPH. Discussed steam and water techniques  We spoke about electrosurgical techniques and laser techniques for dealing with BPH resulting in bladder outlet obstruction. The patient has not been taking tadalafil ever day. He tried it only on days he wanted to have sex which he reports did help but he is not sexually active at the moment. He was paying 20$ at his local [employer], which I thought was acceptable. I advised him to check the price if he refills it as we can find him a place to charge him less if they are charging him more than 20$. He didn't want 30 pills since he is not active however I informed him it would improve his urination if taken every day. He admitted that when he was taking it sporadically it did help his urination some. I reviewed thoughts behind taking it every day. I believe his agreement to try it was sincere, placed a new prescription with refills. As noted above, I placed orders for a transrectal US to be scheduled on or around 10/6/2021. I told the patient that when he arrives on the day of, he should go first to give blood in our lab then have the US. Pt understands this. \par

## 2021-06-12 NOTE — ASSESSMENT
[FreeTextEntry1] : 10/21/19: Michael Canela is a 59 y/o male patient who presents today for an initial evaluation. For the past 4-5 years he has been seeing various urologists. He began taking Tamsulosin 0.4 mg BID to improve his urination. He became concerned when the amount of ejaculate decreased and as a result the Tamsulosin was decreased to one capsule per day. Urinary frequency is reported. He states that the stream is strong. Nocturia x 2 is reported. When he wakes up at night he states that the urinary stream is weaker than it is during the day. He believes that his urination is significantly improved since beginning the Tamsulosin. He states that his sexual function is decreased, erections occur less often. He is a  and drinks at least 2 quarts of water a day. His father was diagnosed with prostate cancer in his 70's. His father also had colon cancer. His mother was diagnosed with breast cancer. He denies a Hx and FHx of kidney stones. Currently smokes less than half a pack of cigarettes a day and is attempting to quit. \par \par 2020: 59 yr male with LUTS presents dysuria , freq and recent fever and chill, resolving with abx cefalexin \par \par \par 10/07/2020: 59 yr male Acute LUTs possible uti , resolved with levaquin, Doxazosin \par to repeat ua micros\par PSA afte [facility]. \par Knee chest position was used. Digital rectal exam found no suspicious rectal masses. No rectal mucosal lesions. Anal tone is normal. The prostate is non tender, with normal texture, discrete borders, and no nodules. It is a 45 gram transurethral resection size. No gross blood on examining fingers. \par PVR 348ml after urination.  after repeat urination. \par Advised the patient to routinely double void. \par Patient will continue taking Tamsulosin 0.4mg BID and will call office in 2 weeks to report status of urination.\par RTO in 3 months for follow up. \par \par 2021: The patient presents today for a follow up. Patient appears concerned and very anxious although reports he does not usually get like this.Since his infection, he doesn't feel like he used to and reports feeling overall different which has been overwhelming. He used to wake up at night feeling an urge but now doesn't. Voids about 8-9x a day. Still has urinary frequency but denies urinary urgency. He reports drinking an adequate amount of liquids. Reports his father had prostate ca. His father had radiation seeds at 78 y/o that broke up and would cause blockages, causing his father a lot of pain, burning, and infections. Patient seems worried about going through the same that his father went through. The patient discontinued Pyridium 200 mg 1 tablet 3x a day after meals for burning. He was taking tamsulosin 0.4 mg 2x daily, however he went down to 1 after having ejaculation contraction problems. Before the pandemic he reports being sexually active. His libido is still good, however he reports that his erections are not as adequate as he'd like. He has high blood pressure and cholesterol. He works as a  for the [employer]`. His last PSA was 3.79 on 10/20/2020 which is acceptable to watch. PVR is 171. \par \par \par 2021: Patient presents today for a telehealth visit for which he gave permission for. The patient was anxious today and apologized if he was asking questions that were detailed but he was very concerned since his father  at age 91, had pros ca and radioactive seed implants at 78 y/o and had many complications in terms of seeds entering the urethra and bladder causing pain, difficulties, and urinary symptoms. The patient said the seeds had broken up. The patient has been on tamsulosin once daily. He finds it optimal in helping some of his urinary symptoms, however he still does have a lot of symptoms. He had a UTI during my care and since then he no longer has the same sensation when he needs to urinate. He has a sense but no urge specifically at night. He finds that his stream sprays. He is going more frequently, every 2-3 hours which is bothersome to him. He reports his stream is weaker at night since the UTI. He was distressed that I wasn’t ordering an MRI of the prostate for him, however I explained that I would write for one but he would not meet the insurance criteria so it most likely would not be authorized and that the current price was about 800$. Much of what he wanted an MRI for was not for detection of prostate cancer but he wanted a better understanding of anatomical problems. I agreed with him that an MRI would provide a better estimation of size and a better knowledge of the prostae than a regular digital rectal exam would, however I informed him that we can get valuable information as well from a transrectal US in  terms of size and configuration. I also informed him that we can get information about configuration from cystoscopy as well . The patient was agreeable that he'd rather get the transrectal US before allotting for an MRI. His current copay for a telehealth visit is about 50$ and he informed me that he was looking not to come in or have a telehealth visit due to expenses involved since his financial means are limited at this current time. He'd be interested in an in person visit in October and would do the transrectal US at that time. I also asked him about blood work. I offered to put in the computer to do ahead of time but he was content on doing it the same day he came in. I talked about procedures to improve urination if medication management failed. I said since we are having difficulties with medical management of symptoms that we could do urodynamics and cystoscopy to better understand the anatomy and physiology relative to his difficulties. I explained the procedure in detail. Review risks in addition to the benefits. I informed him of possible risk of infection and discomfort. Reviewed procedures for improving urination by decreasing bladder outlet obstruction due to BPH. Discussed steam and water techniques  We spoke about electrosurgical techniques and laser techniques for dealing with BPH resulting in bladder outlet obstruction. The patient has not been taking tadalafil ever day. He tried it only on days he wanted to have sex which he reports did help but he is not sexually active at the moment. He was paying 20$ at his local [employer], which I thought was acceptable. I advised him to check the price if he refills it as we can find him a place to charge him less if they are charging him more than 20$. He didn't want 30 pills since he is not active however I informed him it would improve his urination if taken every day. He admitted that when he was taking it sporadically it did help his urination some. I reviewed thoughts behind taking it every day. I believe his agreement to try it was sincere, placed a new prescription with refills. As noted above, I placed orders for a transrectal US to be scheduled on or around 10/6/2021. I told the patient that when he arrives on the day of, he should go first to give blood in our lab then have the US. Pt understands this.\par \par Preparation, telephone visit, and coordination of care took : 60 minutes.

## 2021-06-18 ENCOUNTER — APPOINTMENT (OUTPATIENT)
Dept: MRI IMAGING | Facility: IMAGING CENTER | Age: 61
End: 2021-06-18

## 2021-10-06 ENCOUNTER — APPOINTMENT (OUTPATIENT)
Dept: UROLOGY | Facility: CLINIC | Age: 61
End: 2021-10-06

## 2021-10-07 ENCOUNTER — APPOINTMENT (OUTPATIENT)
Dept: OTOLARYNGOLOGY | Facility: CLINIC | Age: 61
End: 2021-10-07
Payer: COMMERCIAL

## 2021-10-07 VITALS
HEIGHT: 72 IN | BODY MASS INDEX: 31.15 KG/M2 | HEART RATE: 74 BPM | SYSTOLIC BLOOD PRESSURE: 131 MMHG | TEMPERATURE: 98.7 F | DIASTOLIC BLOOD PRESSURE: 85 MMHG | WEIGHT: 230 LBS

## 2021-10-07 DIAGNOSIS — R09.81 NASAL CONGESTION: ICD-10-CM

## 2021-10-07 DIAGNOSIS — H90.3 SENSORINEURAL HEARING LOSS, BILATERAL: ICD-10-CM

## 2021-10-07 DIAGNOSIS — J38.2 NODULES OF VOCAL CORDS: ICD-10-CM

## 2021-10-07 PROCEDURE — 99214 OFFICE O/P EST MOD 30 MIN: CPT | Mod: 25

## 2021-10-07 PROCEDURE — 69210 REMOVE IMPACTED EAR WAX UNI: CPT

## 2021-10-07 PROCEDURE — 31231 NASAL ENDOSCOPY DX: CPT

## 2021-10-07 NOTE — PHYSICAL EXAM
[Nasal Endoscopy Performed] : nasal endoscopy was performed, see procedure section for findings [Midline] : trachea located in midline position [de-identified] : b/l wax  [Normal] : no rashes

## 2021-10-07 NOTE — END OF VISIT
[FreeTextEntry3] : I personally saw and examined PREMA GUTIÉRREZ in detail. I spoke to MARIAJOSE Seay regarding the assessment and plan of care. I reviewed the above assessment and plan of care, and agree. I have made changes in changes in the body of the note where appropriate.I personally reviewed the HPI, PMH, FH, SH, ROS and medications/allergies. I have spoken to MARIAJOSE Seay regarding the history and have personally determined the assessment and plan of care, and documented this myself. I was present and participated in all key portions of the encounter and all procedures noted above. I have made changes in the body of the note where appropriate.\par \par Attesting Faculty: See Attending Signature Below \par \par \par  [Time Spent: ___ minutes] : I have spent [unfilled] minutes of time on the encounter.

## 2021-10-07 NOTE — ASSESSMENT
[FreeTextEntry1] : Patient with hx of vocal cord nodules presents for follow up \par Vocal Cord nodules:\par -will continue to monitor \par -Advised pt to quit smoking \par \par GERD:\par -Antireflux recommendations were given to the patient\par -Maalox and omeprazole prescribed\par -A formal GI evaluation may be needed and was discussed with the patient.\par \par Wax:\par -Cerumen is removed from the right and left  ear canal with a curette and suction.\par -Rx:Debrox be placed in both ears on a routine basis to keep them free of wax.\par -Routine debridement suggested to keep the ears free of wax.\par \par DNS and Rhinitis\par -Rx: Steam humidification \par -Hypertonic saline nasal irrigations \par \par f/u 6 months or prn

## 2021-10-07 NOTE — HISTORY OF PRESENT ILLNESS
[No] : patient does not have a  history of radiation therapy [Ear Fullness] : ear fullness [Anxiety] : anxiety [Smoking] : smoking [None] : No associated symptoms are reported. [de-identified] : 61 yo male\par Patient with hx of vocal cord nodule presents for follow up. States there is no change in his voice since last visit. Complains that his ears feel clogged. Pt has no ear pain, ear drainage, hearing loss, tinnitus, vertigo, nasal congestion, nasal discharge, epistaxis, sinus infections, facial pain, facial pressure, throat pain, dysphagia or fevers\par still smokes\par \par  [Eustachian Tube Dysfunction] : no eustachian tube dysfunction [Cholesteatoma] : no cholesteatoma [Early Onset Hearing Loss] : no early onset hearing loss [Stroke] : no stroke [Allergic Rhinitis] : no allergic rhinitis [Adenoidectomy] : no adenoidectomy [Allergies] : no allergies [Asthma] : no asthma

## 2021-10-07 NOTE — PROCEDURE
[FreeTextEntry6] : Anterior Rhinoscopy insufficient to account for symptoms.\par \par After informed verbal consent is obtained, the fiberoptic nasal endoscope #23 is passed via the right nasal cavity.\par The following anatomic sites were directly examined in a sequential fashion:\par The scope was introduced in both  nasal passages between the middle and inferior turbinates to exam the inferior portion of the middle meatus and the fontanelle, as well as the maxillary ostia.  Next, the scope was passed medially and posteriorly to the middle turbinates to examine the sphenoethmoid recess and the superior turbinate region.\par  \par \par   There is [ 0 ]% obstruction of the nasopharynx with adenoid tissue.\par \par A deviated nasal septum was noted causing obstruction.\par The turbinates were congested-bilateral.\par \par Right Side:\par ·               Mucosa: wnl\par ·               Mucous: none\par ·               Polyp: none\par ·               Inferior Turbinate: sl congested\par ·               Middle Turbinate:sl congested\par ·               Superior Turbinate: wnl\par ·               Inferior Meatus:clear\par ·               Middle Meatus: clear\par ·               Super Meatus: clear\par ·               Sphenoethmoidal Recess: wnl\par \par \par \par Left Side:\par ·               Mucosa: wnl\par ·               Mucous:none\par ·               Polyp: none\par ·               Inferior Turbinate: sl congested\par ·               Middle Turbinate: sl congested\par ·               Superior Turbinate:wnl\par ·               Inferior Meatus: clear\par ·               Middle Meatus: clear\par ·               Super Meatus:clear\par ·               Sphenoethmoidal Recess: wnl\par \par  [de-identified] : Reason for the procedure-throat symptoms not adequately evaluated by mirror exam\par After informed verbal consent is obtained. \par The fiberoptic laryngoscope #23 is passed via the  nasal cavity. There is no adenoid hypertrophy of the nasopharynx.  \par The hypopharynx is clear with no lesions or masses. \par The vocal cords are clear intact, within normal limits and mobile bilaterally with  \par evidence of posterior laryngeal erythema and edema and erythema of the arytenoids.\par \par Tongue Base-wnl\par Larynx-bilat small vc nodules\par Hypopharynx-wnl\par tongue base, \par vallecular-wnl\par epiglottis-wnl\par subglottis-wnl\par posterior pharyngeal wall-wnl\par \par true vocal cords-wnl\par arytenoids-erythema and edema\par false vocal cords-wnl\par ventricles-wnl \par pyriform sinus-wnl no pooling\par aryepiglottic folds-wnl\par \par

## 2021-11-02 ENCOUNTER — APPOINTMENT (OUTPATIENT)
Dept: UROLOGY | Facility: CLINIC | Age: 61
End: 2021-11-02
Payer: COMMERCIAL

## 2021-11-02 PROCEDURE — 99215 OFFICE O/P EST HI 40 MIN: CPT | Mod: 25

## 2021-11-02 PROCEDURE — 76872 US TRANSRECTAL: CPT

## 2021-11-06 NOTE — ADDENDUM
[FreeTextEntry1] : I, Maxine Shieldsin, acted solely as a scribe for Dr. Chris Leggett on this date 11/02/2021.\par \par All medical record entries made by the Scribe were at my, Dr. Chris Leggett, direction and personally dictated by me on 11/02/2021. I have reviewed the chart and agree that the record accurately reflects my personal performance of the history, physical exam, assessment and plan.  I have also personally directed, reviewed and agreed with the chart.

## 2021-11-06 NOTE — ASSESSMENT
[FreeTextEntry1] : 10/21/19: Michael Canela is a 59 y/o male patient who presents today for an initial evaluation. For the past 4-5 years he has been seeing various urologists. He began taking Tamsulosin 0.4 mg BID to improve his urination. He became concerned when the amount of ejaculate decreased and as a result the Tamsulosin was decreased to one capsule per day. Urinary frequency is reported. He states that the stream is strong. Nocturia x 2 is reported. When he wakes up at night he states that the urinary stream is weaker than it is during the day. He believes that his urination is significantly improved since beginning the Tamsulosin. He states that his sexual function is decreased, erections occur less often. He is a  and drinks at least 2 quarts of water a day. His father was diagnosed with prostate cancer in his 70's. His father also had colon cancer. His mother was diagnosed with breast cancer. He denies a Hx and FHx of kidney stones. Currently smokes less than half a pack of cigarettes a day and is attempting to quit. \par \par 2020: 59 yr male with LUTS presents dysuria , freq and recent fever and chill, resolving with abx cefalexin \par \par \par 10/07/2020: 59 yr male Acute LUTs possible uti , resolved with levaquin, Doxazosin \par to repeat ua micros\par PSA afte [facility]NA. \par Knee chest position was used. Digital rectal exam found no suspicious rectal masses. No rectal mucosal lesions. Anal tone is normal. The prostate is non tender, with normal texture, discrete borders, and no nodules. It is a 45 gram transurethral resection size. No gross blood on examining fingers. \par PVR 348ml after urination.  after repeat urination. \par Advised the patient to routinely double void. \par Patient will continue taking Tamsulosin 0.4mg BID and will call office in 2 weeks to report status of urination.\par RTO in 3 months for follow up. \par \par 2021: The patient presents today for a follow up. Patient appears concerned and very anxious although reports he does not usually get like this.Since his infection, he doesn't feel like he used to and reports feeling overall different which has been overwhelming. He used to wake up at night feeling an urge but now doesn't. Voids about 8-9x a day. Still has urinary frequency but denies urinary urgency. He reports drinking an adequate amount of liquids. Reports his father had prostate ca. His father had radiation seeds at 76 y/o that broke up and would cause blockages, causing his father a lot of pain, burning, and infections. Patient seems worried about going through the same that his father went through. The patient discontinued Pyridium 200 mg 1 tablet 3x a day after meals for burning. He was taking tamsulosin 0.4 mg 2x daily, however he went down to 1 after having ejaculation contraction problems. Before the pandemic he reports being sexually active. His libido is still good, however he reports that his erections are not as adequate as he'd like. He has high blood pressure and cholesterol. He works as a  for the [employer]. His last PSA was 3.79 on 10/20/2020 which is acceptable to watch. PVR is 171. \par \par 2021: Patient presents today for a telehealth visit for which he gave permission for. The patient was anxious today and apologized if he was asking questions that were detailed but he was very concerned since his father  at age 91, had pros ca and radioactive seed implants at 76 y/o and had many complications in terms of seeds entering the urethra and bladder causing pain, difficulties, and urinary symptoms. The patient said the seeds had broken up. The patient has been on tamsulosin once daily. He finds it optimal in helping some of his urinary symptoms, however he still does have a lot of symptoms. He had a UTI during my care and since then he no longer has the same sensation when he needs to urinate. He has a sense but no urge specifically at night. He finds that his stream sprays. He is going more frequently, every 2-3 hours which is bothersome to him. He reports his stream is weaker at night since the UTI. He was distressed that I wasn’t ordering an MRI of the prostate for him, however I explained that I would write for one but he would not meet the insurance criteria so it most likely would not be authorized and that the current price was about 800$. Much of what he wanted an MRI for was not for detection of prostate cancer but he wanted a better understanding of anatomical problems. I agreed with him that an MRI would provide a better estimation of size and a better knowledge of the prostae than a regular digital rectal exam would, however I informed him that we can get valuable information as well from a transrectal US in  terms of size and configuration. I also informed him that we can get information about configuration from cystoscopy as well . The patient was agreeable that he'd rather get the transrectal US before allotting for an MRI. His current copay for a telehealth visit is about 50$ and he informed me that he was looking not to come in or have a telehealth visit due to expenses involved since his financial means are limited at this current time. He'd be interested in an in person visit in October and would do the transrectal US at that time. I also asked him about blood work. I offered to put in the computer to do ahead of time but he was content on doing it the same day he came in. I talked about procedures to improve urination if medication management failed. I said since we are having difficulties with medical management of symptoms that we could do urodynamics and cystoscopy to better understand the anatomy and physiology relative to his difficulties. I explained the procedure in detail. Review risks in addition to the benefits. I informed him of possible risk of infection and discomfort. Reviewed procedures for improving urination by decreasing bladder outlet obstruction due to BPH. Discussed steam and water techniques  We spoke about electrosurgical techniques and laser techniques for dealing with BPH resulting in bladder outlet obstruction. The patient has not been taking tadalafil ever day. He tried it only on days he wanted to have sex which he reports did help but he is not sexually active at the moment. He was paying 20$ at his local [facility], which I thought was acceptable. I advised him to check the price if he refills it as we can find him a place to charge him less if they are charging him more than 20$. He didn't want 30 pills since he is not active however I informed him it would improve his urination if taken every day. He admitted that when he was taking it sporadically it did help his urination some. I reviewed thoughts behind taking it every day. I believe his agreement to try it was sincere, placed a new prescription with refills. As noted above, I placed orders for a transrectal US to be scheduled on or around 10/6/2021. I told the patient that when he arrives on the day of, he should go first to give blood in our lab then have the US. Pt understands this.\par \par 2021: Patient presents today for prostate US. Prostate US results show normal echogenicity. Prostatic calcifications were visualized. No masses visualized. No obstruction of ejaculatory ducts. Seminal vesicles are normal size with no blood in it. +FHx of prostate cancer in father who was dx at 76y/o. Had radioactive seeds which caused pain and problems in him in that seeds broke up at as gravel. States ever since starting Tadalafil 5mg with Tamsulosin 0.4mg once daily half hour after dinner, feels he is ejaculating more. States he in the past took Tamsulosin twice daily, but was disturbed that it affected his ejaculate. States he had been celibate since the pandemic, but is interesting in returning. Since taking Tadalafil, has been ejaculating more and feels more satisfied when he masturbates. Strength of erections vary. Additionally, patient states he must urinate every 3-4 hours. Wakes 2x at night to urinate. States that since UTI he had 13 months ago, feels the urge to urinate has not been as severe but feels stream has been weaker since then. Dealing with some stress as mother is having speech problems s/p stroke and sees speech pathologist. \par \par Digital rectal exam found no suspicious rectal masses. No rectal mucosal lesions. Anal tone is normal. Small external hemorrhoid 7 o'clock. The prostate is non tender, with normal texture, discrete borders, and no nodules. It is a 45 gram transurethral resection size, feels larger than what ultrasound showed. No gross blood on examining fingers. \par \par I offered to switch Tamsulosin to Silodosin as Silodosin should not have as great an effect on ejaculate volume, but patient is satisfied with current regimen of Tadalafil 5mg once daily and Tamsulosin 0.4mg once daily. \par \par I discussed that when patient finds a partner, he may take an additional 5mg of Tadalafil in addition to daily 5mg for a total of 10mg for stronger erections. \par \par I discussed waking 2x at night is acceptable, but he should RTO if it becomes more frequent to 4-5x. \par \par I reassured the patient that there is nothing in the prostate US that suggests cancer, and that prostate size of 39.7 cc seen on today's US for his age is normal. \par \par Blood work today includes alkaline phosphatase, BMP, dihydrotestosterone, estradiol, estrogen, PSA, testosterone, LH, prolactin. \par The patient produced a urine sample which will be sent for urinalysis, urine cytology, and urine culture. \par \par Schedule telehealth appointment in 1 week to review lab results. We should wait until 6:05PM to call the patient as he ends work at 6 PM.  \par \par Schedule telehealth appointment in 6 months for follow up. \par RTO in 1 year for follow up or sooner if new urinary symptoms develop. \par \par Preparation, in-person office visit, and coordination of care took: 40  minutes

## 2021-11-06 NOTE — PHYSICAL EXAM
[General Appearance - Well Developed] : well developed [Normal Appearance] : normal appearance [General Appearance - In No Acute Distress] : no acute distress [Abdomen Soft] : soft [Abdomen Tenderness] : non-tender [Skin Color & Pigmentation] : normal skin color and pigmentation [Edema] : no peripheral edema [] : no respiratory distress [Respiration, Rhythm And Depth] : normal respiratory rhythm and effort [Exaggerated Use Of Accessory Muscles For Inspiration] : no accessory muscle use [Oriented To Time, Place, And Person] : oriented to person, place, and time [Normal Station and Gait] : the gait and station were normal for the patient's age [No Focal Deficits] : no focal deficits [FreeTextEntry1] : anxious

## 2021-11-06 NOTE — HISTORY OF PRESENT ILLNESS
[FreeTextEntry1] : 10/21/19: Michael Canela is a 61 y/o male patient who presents today for an initial evaluation. For the past 4-5 years he has been seeing various urologists. He began taking Tamsulosin 0.4 mg BID to improve his urination. He became concerned when the amount of ejaculate decreased and as a result the Tamsulosin was decreased to one capsule per day. Urinary frequency is reported. He states that the stream is strong. Nocturia x 2 is reported. When he wakes up at night he states that the urinary stream is weaker than it is during the day. He believes that his urination is significantly improved since beginning the Tamsulosin. He states that his sexual function is decreased, erections occur less often. He is a  and drinks at least 2 quarts of water a day. His father was diagnosed with prostate cancer in his 70's. His father also had colon cancer. His mother was diagnosed with breast cancer. He denies a Hx and FHx of kidney stones. Currently smokes less than half a pack of cigarettes a day and is attempting to quit. \par \par 2020: 59 yr male history of LUTS for 6 yrs going to urology. Present buring , freq, fever , pain at tip of penis, supra pubic pressure started one week. Fever resolved with abx cefalexin from urgent care. Freq and buring is resolving. Freq down to 2 hrly from q 45 min. Anxious .  . PSA 1.58 as at 10/19 \par \par 10/7/2020: 59 yr male with acute  LUTs symptoms concerning for UTI ,now resolved after Levofloxacin 750mg take for 7 days and feels well today.  Doxazosin. Takes Tamsulosin 0.4mg BID. \par Said pressure and burning and pain tip of penis resolved.  Wakes 2X nocte to pee.  Freq during the day .  Feels like do not empty bladder and feels urgency in the morning hours, but stream is good and no leakage. Feels increased urgency when driving. Pt concerned about low testosterone. Erections are adequate. \par UA/Cx  show now bacterial growth.  Unremarkable \par PSA  1.58 oct 2019 \par +FHx of bladder stone in father. \par \par 2021: The patient presents today for a follow up. Patient appears concerned and very anxious although reports he does not usually get like this.Since his infection, he doesn't feel like he used to and reports feeling overall different which has been overwhelming. He used to wake up at night feeling an urge but now doesn't. Voids about 8-9x a day. Still has urinary frequency but denies urinary urgency. He reports drinking an adequate amount of liquids. Reports his father had prostate ca. His father had radiation seeds at 78 y/o that broke up and would cause blockages, causing his father a lot of pain, burning, and infections. Patient seems worried about going through the same that his father went through. The patient discontinued Pyridium 200 mg 1 tablet 3x a day after meals for burning. He was taking tamsulosin 0.4 mg 2x daily, however he went down to 1 after having ejaculation contraction problems. Before the pandemic he reports being sexually active. His libido is still good, however he reports that his erections are not as adequate as he'd like. He has high blood pressure and cholesterol. He works as a  for the [employer]. His last PSA was 3.79 on 10/20/2020 which is acceptable to watch. PVR is 171. \par \par 2021: Patient was not seen in office today. \par \par 2021: Patient presents today for a telehealth visit for which he gave permission for. The patient was anxious today and apologized if he was asking questions that were detailed but he was very concerned since his father  at age 91, had pros ca and radioactive seed implants at 78 y/o and had many complications in terms of seeds entering the urethra and bladder causing pain, difficulties, and urinary symptoms. The patient said the seeds had broken up. The patient has been on tamsulosin once daily. He finds it optimal in helping some of his urinary symptoms, however he still does have a lot of symptoms. He had a UTI during my care and since then he no longer has the same sensation when he needs to urinate. He has a sense but no urge specifically at night. He finds that his stream sprays. He is going more frequently, every 2-3 hours which is bothersome to him. He reports his stream is weaker at night since the UTI. He was distressed that I wasn’t ordering an MRI of the prostate for him, however I explained that I would write for one but he would not meet the insurance criteria so it most likely would not be authorized and that the current price was about 800$. Much of what he wanted an MRI for was not for detection of prostate cancer but he wanted a better understanding of anatomical problems. I agreed with him that an MRI would provide a better estimation of size and a better knowledge of the prostae than a regular digital rectal exam would, however I informed him that we can get valuable information as well from a transrectal US in  terms of size and configuration. I also informed him that we can get information about configuration from cystoscopy as well . The patient was agreeable that he'd rather get the transrectal US before allotting for an MRI. His current copay for a telehealth visit is about 50$ and he informed me that he was looking not to come in or have a telehealth visit due to expenses involved since his financial means are limited at this current time. He'd be interested in an in person visit in October and would do the transrectal US at that time. I also asked him about blood work. I offered to put in the computer to do ahead of time but he was content on doing it the same day he came in. I talked about procedures to improve urination if medication management failed. I said since we are having difficulties with medical management of symptoms that we could do urodynamics and cystoscopy to better understand the anatomy and physiology relative to his difficulties. I explained the procedure in detail. Review risks in addition to the benefits. I informed him of possible risk of infection and discomfort. Reviewed procedures for improving urination by decreasing bladder outlet obstruction due to BPH. Discussed steam and water techniques  We spoke about electrosurgical techniques and laser techniques for dealing with BPH resulting in bladder outlet obstruction. The patient has not been taking tadalafil ever day. He tried it only on days he wanted to have sex which he reports did help but he is not sexually active at the moment. He was paying 20$ at his local [email or web address], which I thought was acceptable. I advised him to check the price if he refills it as we can find him a place to charge him less if they are charging him more than 20$. He didn't want 30 pills since he is not active however I informed him it would improve his urination if taken every day. He admitted that when he was taking it sporadically it did help his urination some. I reviewed thoughts behind taking it every day. I believe his agreement to try it was sincere, placed a new prescription with refills. As noted above, I placed orders for a transrectal US to be scheduled on or around 10/6/2021. I told the patient that when he arrives on the day of, he should go first to give blood in our lab then have the US. Pt understands this. \par \par 2021: Patient presents today for prostate US. Prostate US results show normal echogenicity. Prostatic calcifications were visualized. No masses visualized. No obstruction of ejaculatory ducts. Seminal vesicles are normal size with no blood in it. +FHx of prostate cancer in father who was dx at 76y/o. Had radioactive seeds which caused pain and problems in him in that seeds broke up at as gravel. States ever since starting Tadalafil 5mg with Tamsulosin 0.4mg once daily half hour after dinner, feels he is ejaculating more. States he in the past took Tamsulosin twice daily, but was disturbed that it affected his ejaculate. States he had been celibate since the pandemic, but is interesting in returning. Since taking Tadalafil, has been ejaculating more and feels more satisfied when he masturbates. Strength of erections vary. Additionally, patient states he must urinate every 3-4 hours. Wakes 2x at night to urinate. States that since UTI he had 13 months ago, feels the urge to urinate has not been as severe. Dealing with some stress as mother is having speech problems and sees speech pathologist.

## 2021-11-07 LAB
ALP BLD-CCNC: 99 U/L
ANION GAP SERPL CALC-SCNC: 13 MMOL/L
APPEARANCE: CLEAR
BACTERIA UR CULT: NORMAL
BACTERIA: NEGATIVE
BILIRUBIN URINE: NEGATIVE
BLOOD URINE: NEGATIVE
BUN SERPL-MCNC: 16 MG/DL
CALCIUM SERPL-MCNC: 10 MG/DL
CHLORIDE SERPL-SCNC: 104 MMOL/L
CO2 SERPL-SCNC: 24 MMOL/L
COLOR: NORMAL
CREAT SERPL-MCNC: 0.9 MG/DL
ESTRADIOL SERPL-MCNC: 19 PG/ML
ESTROGEN SERPL-MCNC: 311 PG/ML
GLUCOSE QUALITATIVE U: NEGATIVE
GLUCOSE SERPL-MCNC: 106 MG/DL
HYALINE CASTS: 0 /LPF
KETONES URINE: NEGATIVE
LEUKOCYTE ESTERASE URINE: NEGATIVE
LH SERPL-ACNC: 3.6 IU/L
MICROSCOPIC-UA: NORMAL
NITRITE URINE: NEGATIVE
PH URINE: 6
POTASSIUM SERPL-SCNC: 4.7 MMOL/L
PROLACTIN SERPL-MCNC: 16.9 NG/ML
PROTEIN URINE: NEGATIVE
PSA SERPL-MCNC: 2.08 NG/ML
RED BLOOD CELLS URINE: 0 /HPF
SODIUM SERPL-SCNC: 141 MMOL/L
SPECIFIC GRAVITY URINE: 1.01
SQUAMOUS EPITHELIAL CELLS: 0 /HPF
TESTOST BND SERPL-MCNC: 10.8 PG/ML
TESTOSTERONE TOTAL S: 235 NG/DL
URINE CYTOLOGY: NORMAL
UROBILINOGEN URINE: NORMAL
WHITE BLOOD CELLS URINE: 0 /HPF

## 2021-11-10 ENCOUNTER — NON-APPOINTMENT (OUTPATIENT)
Age: 61
End: 2021-11-10

## 2021-11-11 ENCOUNTER — APPOINTMENT (OUTPATIENT)
Dept: UROLOGY | Facility: CLINIC | Age: 61
End: 2021-11-11
Payer: COMMERCIAL

## 2021-11-11 PROCEDURE — 99443: CPT

## 2021-11-13 LAB — ANDROSTANOLONE SERPL-MCNC: 22 NG/DL

## 2021-11-14 NOTE — REVIEW OF SYSTEMS
[Nocturia] : nocturia [Anxiety] : anxiety [Erectile Dysfunction] : erectile dysfunction [Feeling Tired] : feeling tired [Feeling Poorly] : not feeling poorly [Difficulty Walking] : no difficulty walking

## 2021-11-14 NOTE — HISTORY OF PRESENT ILLNESS
[FreeTextEntry1] : 10/21/19: Michael Canela is a 61 y/o male patient who presents today for an initial evaluation. For the past 4-5 years he has been seeing various urologists. He began taking Tamsulosin 0.4 mg BID to improve his urination. He became concerned when the amount of ejaculate decreased and as a result the Tamsulosin was decreased to one capsule per day. Urinary frequency is reported. He states that the stream is strong. Nocturia x 2 is reported. When he wakes up at night he states that the urinary stream is weaker than it is during the day. He believes that his urination is significantly improved since beginning the Tamsulosin. He states that his sexual function is decreased, erections occur less often. He is a  and drinks at least 2 quarts of water a day. His father was diagnosed with prostate cancer in his 70's. His father also had colon cancer. His mother was diagnosed with breast cancer. He denies a Hx and FHx of kidney stones. Currently smokes less than half a pack of cigarettes a day and is attempting to quit. \par \par 2020: 59 yr male history of LUTS for 6 yrs going to urology. Present buring , freq, fever , pain at tip of penis, supra pubic pressure started one week. Fever resolved with abx cefalexin from urgent care. Freq and buring is resolving. Freq down to 2 hrly from q 45 min. Anxious .  . PSA 1.58 as at 10/19 \par \par 10/7/2020: 59 yr male with acute  LUTs symptoms concerning for UTI ,now resolved after Levofloxacin 750mg take for 7 days and feels well today.  Doxazosin. Takes Tamsulosin 0.4mg BID. \par Said pressure and burning and pain tip of penis resolved.  Wakes 2X nocte to pee.  Freq during the day .  Feels like do not empty bladder and feels urgency in the morning hours, but stream is good and no leakage. Feels increased urgency when driving. Pt concerned about low testosterone. Erections are adequate. \par UA/Cx  show now bacterial growth.  Unremarkable \par PSA  1.58 oct 2019 \par +FHx of bladder stone in father. \par \par 2021: The patient presents today for a follow up. Patient appears concerned and very anxious although reports he does not usually get like this.Since his infection, he doesn't feel like he used to and reports feeling overall different which has been overwhelming. He used to wake up at night feeling an urge but now doesn't. Voids about 8-9x a day. Still has urinary frequency but denies urinary urgency. He reports drinking an adequate amount of liquids. Reports his father had prostate ca. His father had radiation seeds at 78 y/o that broke up and would cause blockages, causing his father a lot of pain, burning, and infections. Patient seems worried about going through the same that his father went through. The patient discontinued Pyridium 200 mg 1 tablet 3x a day after meals for burning. He was taking tamsulosin 0.4 mg 2x daily, however he went down to 1 after having ejaculation contraction problems. Before the pandemic he reports being sexually active. His libido is still good, however he reports that his erections are not as adequate as he'd like. He has high blood pressure and cholesterol. He works as a  for the [employer]. His last PSA was 3.79 on 10/20/2020 which is acceptable to watch. PVR is 171. \par \par 2021: Patient was not seen in office today. \par \par 2021: Patient presents today for a telehealth visit for which he gave permission for. The patient was anxious today and apologized if he was asking questions that were detailed but he was very concerned since his father  at age 91, had pros ca and radioactive seed implants at 78 y/o and had many complications in terms of seeds entering the urethra and bladder causing pain, difficulties, and urinary symptoms. The patient said the seeds had broken up. The patient has been on tamsulosin once daily. He finds it optimal in helping some of his urinary symptoms, however he still does have a lot of symptoms. He had a UTI during my care and since then he no longer has the same sensation when he needs to urinate. He has a sense but no urge specifically at night. He finds that his stream sprays. He is going more frequently, every 2-3 hours which is bothersome to him. He reports his stream is weaker at night since the UTI. He was distressed that I wasn’t ordering an MRI of the prostate for him, however I explained that I would write for one but he would not meet the insurance criteria so it most likely would not be authorized and that the current price was about 800$. Much of what he wanted an MRI for was not for detection of prostate cancer but he wanted a better understanding of anatomical problems. I agreed with him that an MRI would provide a better estimation of size and a better knowledge of the prostae than a regular digital rectal exam would, however I informed him that we can get valuable information as well from a transrectal US in  terms of size and configuration. I also informed him that we can get information about configuration from cystoscopy as well . The patient was agreeable that he'd rather get the transrectal US before allotting for an MRI. His current copay for a telehealth visit is about 50$ and he informed me that he was looking not to come in or have a telehealth visit due to expenses involved since his financial means are limited at this current time. He'd be interested in an in person visit in October and would do the transrectal US at that time. I also asked him about blood work. I offered to put in the computer to do ahead of time but he was content on doing it the same day he came in. I talked about procedures to improve urination if medication management failed. I said since we are having difficulties with medical management of symptoms that we could do urodynamics and cystoscopy to better understand the anatomy and physiology relative to his difficulties. I explained the procedure in detail. Review risks in addition to the benefits. I informed him of possible risk of infection and discomfort. Reviewed procedures for improving urination by decreasing bladder outlet obstruction due to BPH. Discussed steam and water techniques  We spoke about electrosurgical techniques and laser techniques for dealing with BPH resulting in bladder outlet obstruction. The patient has not been taking tadalafil ever day. He tried it only on days he wanted to have sex which he reports did help but he is not sexually active at the moment. He was paying 20$ at his local [facility], which I thought was acceptable. I advised him to check the price if he refills it as we can find him a place to charge him less if they are charging him more than 20$. He didn't want 30 pills since he is not active however I informed him it would improve his urination if taken every day. He admitted that when he was taking it sporadically it did help his urination some. I reviewed thoughts behind taking it every day. I believe his agreement to try it was sincere, placed a new prescription with refills. As noted above, I placed orders for a transrectal US to be scheduled on or around 10/6/2021. I told the patient that when he arrives on the day of, he should go first to give blood in our lab then have the US. Pt understands this. \par \par 2021: Patient presents today for prostate US. Prostate US results show normal echogenicity. Prostatic calcifications were visualized. No masses visualized. No obstruction of ejaculatory ducts. Seminal vesicles are normal size with no blood in it. +FHx of prostate cancer in father who was dx at 76y/o. Had radioactive seeds which caused pain and problems in him in that seeds broke up at as gravel. States ever since starting Tadalafil 5mg with Tamsulosin 0.4mg once daily half hour after dinner, feels he is ejaculating more. States he in the past took Tamsulosin twice daily, but was disturbed that it affected his ejaculate. States he had been celibate since the pandemic, but is interesting in returning. Since taking Tadalafil, has been ejaculating more and feels more satisfied when he masturbates. Strength of erections vary. Additionally, patient states he must urinate every 3-4 hours. Wakes 2x at night to urinate. States that since UTI he had 13 months ago, feels the urge to urinate has not been as severe. Dealing with some stress as mother is having speech problems and sees speech pathologist. \par \par 2021: Patient presents today for a follow up telephone visit for which he gave permission for. Currently on tadalafil 5 mg once daily and tamsulosin 0.4 mg once daily. Patient had lab work done on 2021. UA was good. Blood work demonstrated a PSA of 2.08. Estradiol level, prolactin, LH, and calcium were all normal. Creatinine was 0.9. Glucose was 106. Electrolytes and Alk phos were normal. Urine cytology was benign. Urine culture was no growth. Testosterone free was normal at 10.8, testosterone total was slightly low at 235, but with a normal free testosterone I would be reluctant to prescribe something to augment testosterone. Estrogens were elevated at 311 where the normal amount for males is within range of . The patient states that he has been feeling a little tired everyday around 3 pm where he feels like he could take a nap. On tamsulosin and tadalafil, his urination is improved.

## 2021-11-28 NOTE — ASSESSMENT
[FreeTextEntry1] : 10/21/19: Michael Canela is a 59 y/o male patient who presents today for an initial evaluation. For the past 4-5 years he has been seeing various urologists. He began taking Tamsulosin 0.4 mg BID to improve his urination. He became concerned when the amount of ejaculate decreased and as a result the Tamsulosin was decreased to one capsule per day. Urinary frequency is reported. He states that the stream is strong. Nocturia x 2 is reported. When he wakes up at night he states that the urinary stream is weaker than it is during the day. He believes that his urination is significantly improved since beginning the Tamsulosin. He states that his sexual function is decreased, erections occur less often. He is a  and drinks at least 2 quarts of water a day. His father was diagnosed with prostate cancer in his 70's. His father also had colon cancer. His mother was diagnosed with breast cancer. He denies a Hx and FHx of kidney stones. Currently smokes less than half a pack of cigarettes a day and is attempting to quit. \par \par 2020: 59 yr male with LUTS presents dysuria , freq and recent fever and chill, resolving with abx cefalexin \par \par \par 10/07/2020: 59 yr male Acute LUTs possible uti , resolved with levaquin, Doxazosin \par to repeat ua micros\par PSA afte [facility]. \par Knee chest position was used. Digital rectal exam found no suspicious rectal masses. No rectal mucosal lesions. Anal tone is normal. The prostate is non tender, with normal texture, discrete borders, and no nodules. It is a 45 gram transurethral resection size. No gross blood on examining fingers. \par PVR 348ml after urination.  after repeat urination. \par Advised the patient to routinely double void. \par Patient will continue taking Tamsulosin 0.4mg BID and will call office in 2 weeks to report status of urination.\par RTO in 3 months for follow up. \par \par 2021: The patient presents today for a follow up. Patient appears concerned and very anxious although reports he does not usually get like this.Since his infection, he doesn't feel like he used to and reports feeling overall different which has been overwhelming. He used to wake up at night feeling an urge but now doesn't. Voids about 8-9x a day. Still has urinary frequency but denies urinary urgency. He reports drinking an adequate amount of liquids. Reports his father had prostate ca. His father had radiation seeds at 78 y/o that broke up and would cause blockages, causing his father a lot of pain, burning, and infections. Patient seems worried about going through the same that his father went through. The patient discontinued Pyridium 200 mg 1 tablet 3x a day after meals for burning. He was taking tamsulosin 0.4 mg 2x daily, however he went down to 1 after having ejaculation contraction problems. Before the pandemic he reports being sexually active. His libido is still good, however he reports that his erections are not as adequate as he'd like. He has high blood pressure and cholesterol. He works as a  for the [employer]. His last PSA was 3.79 on 10/20/2020 which is acceptable to watch. PVR is 171. \par \par 2021: Patient presents today for a telehealth visit for which he gave permission for. The patient was anxious today and apologized if he was asking questions that were detailed but he was very concerned since his father  at age 91, had pros ca and radioactive seed implants at 78 y/o and had many complications in terms of seeds entering the urethra and bladder causing pain, difficulties, and urinary symptoms. The patient said the seeds had broken up. The patient has been on tamsulosin once daily. He finds it optimal in helping some of his urinary symptoms, however he still does have a lot of symptoms. He had a UTI during my care and since then he no longer has the same sensation when he needs to urinate. He has a sense but no urge specifically at night. He finds that his stream sprays. He is going more frequently, every 2-3 hours which is bothersome to him. He reports his stream is weaker at night since the UTI. He was distressed that I wasn’t ordering an MRI of the prostate for him, however I explained that I would write for one but he would not meet the insurance criteria so it most likely would not be authorized and that the current price was about 800$. Much of what he wanted an MRI for was not for detection of prostate cancer but he wanted a better understanding of anatomical problems. I agreed with him that an MRI would provide a better estimation of size and a better knowledge of the prostae than a regular digital rectal exam would, however I informed him that we can get valuable information as well from a transrectal US in  terms of size and configuration. I also informed him that we can get information about configuration from cystoscopy as well . The patient was agreeable that he'd rather get the transrectal US before allotting for an MRI. His current copay for a telehealth visit is about 50$ and he informed me that he was looking not to come in or have a telehealth visit due to expenses involved since his financial means are limited at this current time. He'd be interested in an in person visit in October and would do the transrectal US at that time. I also asked him about blood work. I offered to put in the computer to do ahead of time but he was content on doing it the same day he came in. I talked about procedures to improve urination if medication management failed. I said since we are having difficulties with medical management of symptoms that we could do urodynamics and cystoscopy to better understand the anatomy and physiology relative to his difficulties. I explained the procedure in detail. Review risks in addition to the benefits. I informed him of possible risk of infection and discomfort. Reviewed procedures for improving urination by decreasing bladder outlet obstruction due to BPH. Discussed steam and water techniques  We spoke about electrosurgical techniques and laser techniques for dealing with BPH resulting in bladder outlet obstruction. The patient has not been taking tadalafil ever day. He tried it only on days he wanted to have sex which he reports did help but he is not sexually active at the moment. He was paying 20$ at his local [facility], which I thought was acceptable. I advised him to check the price if he refills it as we can find him a place to charge him less if they are charging him more than 20$. He didn't want 30 pills since he is not active however I informed him it would improve his urination if taken every day. He admitted that when he was taking it sporadically it did help his urination some. I reviewed thoughts behind taking it every day. I believe his agreement to try it was sincere, placed a new prescription with refills. As noted above, I placed orders for a transrectal US to be scheduled on or around 10/6/2021. I told the patient that when he arrives on the day of, he should go first to give blood in our lab then have the US. Pt understands this.\par \par 2021: Patient presents today for prostate US. Prostate US results show normal echogenicity. Prostatic calcifications were visualized. No masses visualized. No obstruction of ejaculatory ducts. Seminal vesicles are normal size with no blood in it. +FHx of prostate cancer in father who was dx at 76y/o. Had radioactive seeds which caused pain and problems in him in that seeds broke up at as gravel. States ever since starting Tadalafil 5mg with Tamsulosin 0.4mg once daily half hour after dinner, feels he is ejaculating more. States he in the past took Tamsulosin twice daily, but was disturbed that it affected his ejaculate. States he had been celibate since the pandemic, but is interesting in returning. Since taking Tadalafil, has been ejaculating more and feels more satisfied when he masturbates. Strength of erections vary. Additionally, patient states he must urinate every 3-4 hours. Wakes 2x at night to urinate. States that since UTI he had 13 months ago, feels the urge to urinate has not been as severe but feels stream has been weaker since then. Dealing with some stress as mother is having speech problems s/p stroke and sees speech pathologist. \par \par Digital rectal exam found no suspicious rectal masses. No rectal mucosal lesions. Anal tone is normal. Small external hemorrhoid 7 o'clock. The prostate is non tender, with normal texture, discrete borders, and no nodules. It is a 45 gram transurethral resection size, feels larger than what ultrasound showed. No gross blood on examining fingers. \par \par I offered to switch Tamsulosin to Silodosin as Silodosin should not have as great an effect on ejaculate volume, but patient is satisfied with current regimen of Tadalafil 5mg once daily and Tamsulosin 0.4mg once daily. \par I discussed that when patient finds a partner, he may take an additional 5mg of Tadalafil in addition to daily 5mg for a total of 10mg for stronger erections. \par I discussed waking 2x at night is acceptable, but he should RTO if it becomes more frequent to 4-5x. \par I reassured the patient that there is nothing in the prostate US that suggests cancer, and that prostate size of 39.7 cc seen on today's US for his age is normal. \par Blood work today includes alkaline phosphatase, BMP, dihydrotestosterone, estradiol, estrogen, PSA, testosterone, LH, prolactin. \par The patient produced a urine sample which will be sent for urinalysis, urine cytology, and urine culture. \par Schedule telehealth appointment in 1 week to review lab results. We should wait until 6:05PM to call the patient as he ends work at 6 PM.  \par Schedule telehealth appointment in 6 months for follow up. \par RTO in 1 year for follow up or sooner if new urinary symptoms develop. \par \par 2021: Patient presents today for a follow up telephone visit for which he gave permission for. Currently on tadalafil 5 mg once daily and tamsulosin 0.4 mg once daily. Patient had lab work done on 2021. UA was good. Blood work demonstrated a PSA of 2.08. Estradiol level, prolactin, LH, and calcium were all normal. Creatinine was 0.9. Glucose was 106. Electrolytes and Alk phos were normal. Urine cytology was benign. Urine culture was no growth. Testosterone free was normal at 10.8, testosterone total was slightly low at 235, but with a normal free testosterone I would be reluctant to prescribe something to augment testosterone. Estrogens were elevated at 311 where the normal amount for males is within range of . The patient states that he has been feeling a little tired everyday around 3 pm where he feels like he could take a nap. On tamsulosin and tadalafil, his urination is improved. \par \par Will repeat blood work next week to measure estrogens again. If estrogens remain elevated, will consider prescribing Anastrazole to decrease estrogens and raise testosterone slightly. \par \par Pt will continue tamsulosin 0.4 mg once daily and tadalafil 5 mg once daily. \par \par Will have a telephone visit a few days after blood work is done to review the results and reevaluate. \par \par Preparation, telephone visit, and coordination of care took : 30 minutes. 
No

## 2021-12-02 ENCOUNTER — APPOINTMENT (OUTPATIENT)
Dept: UROLOGY | Facility: CLINIC | Age: 61
End: 2021-12-02

## 2021-12-02 LAB
ANDROST SERPL-MCNC: 74 NG/DL
ANDROSTANOLONE SERPL-MCNC: 23 NG/DL
ANION GAP SERPL CALC-SCNC: 14 MMOL/L
BUN SERPL-MCNC: 14 MG/DL
CALCIUM SERPL-MCNC: 9.4 MG/DL
CHLORIDE SERPL-SCNC: 103 MMOL/L
CO2 SERPL-SCNC: 24 MMOL/L
CREAT SERPL-MCNC: 0.8 MG/DL
DHEA-S SERPL-MCNC: 139 UG/DL
ESTRADIOL SERPL-MCNC: 20 PG/ML
ESTROGEN SERPL-MCNC: 416 PG/ML
FSH SERPL-MCNC: 3.8 IU/L
GLUCOSE SERPL-MCNC: 137 MG/DL
LH SERPL-ACNC: 2.9 IU/L
POTASSIUM SERPL-SCNC: 4.5 MMOL/L
PROGEST SERPL-MCNC: 0.2 NG/ML
PROLACTIN SERPL-MCNC: 20.7 NG/ML
SODIUM SERPL-SCNC: 140 MMOL/L
TESTOST BND SERPL-MCNC: 9.2 PG/ML
TESTOSTERONE TOTAL S: 244 NG/DL

## 2021-12-04 LAB — SHBG SERPL-SCNC: 30.5 NMOL/L

## 2021-12-10 LAB — DHEA SERPL-MCNC: 55 NG/DL

## 2022-01-28 ENCOUNTER — NON-APPOINTMENT (OUTPATIENT)
Age: 62
End: 2022-01-28

## 2022-03-18 ENCOUNTER — NON-APPOINTMENT (OUTPATIENT)
Age: 62
End: 2022-03-18

## 2022-08-10 ENCOUNTER — APPOINTMENT (OUTPATIENT)
Dept: UROLOGY | Facility: CLINIC | Age: 62
End: 2022-08-10

## 2022-08-10 PROCEDURE — 99443: CPT

## 2022-08-10 NOTE — ADDENDUM
[FreeTextEntry1] : I, Maxine Shieldsin, acted solely as a scribe for Dr. Chris Leggett on this date 12/02/2021.\par \par All medical record entries made by the Scribe were at my, Dr. Chris Leggett, direction and personally dictated by me on 12/02/2021. I have reviewed the chart and agree that the record accurately reflects my personal performance of the history, physical exam, assessment and plan.  I have also personally directed, reviewed and agreed with the chart.

## 2022-08-10 NOTE — ASSESSMENT
[FreeTextEntry1] : 10/21/19: Michael Canela is a 62 y/o male patient who presents today for an initial evaluation. For the past 4-5 years he has been seeing various urologists. He began taking Tamsulosin 0.4 mg BID to improve his urination. He became concerned when the amount of ejaculate decreased and as a result the Tamsulosin was decreased to one capsule per day. Urinary frequency is reported. He states that the stream is strong. Nocturia x 2 is reported. When he wakes up at night he states that the urinary stream is weaker than it is during the day. He believes that his urination is significantly improved since beginning the Tamsulosin. He states that his sexual function is decreased, erections occur less often. He is a  and drinks at least 2 quarts of water a day. His father was diagnosed with prostate cancer in his 70's. His father also had colon cancer. His mother was diagnosed with breast cancer. He denies a Hx and FHx of kidney stones. Currently smokes less than half a pack of cigarettes a day and is attempting to quit. \par \par 2020: 59 yr male with LUTS presents dysuria , freq and recent fever and chill, resolving with abx cefalexin \par \par \par 10/07/2020: 59 yr male Acute LUTs possible uti , resolved with levaquin, Doxazosin \par to repeat ua micros\par PSA afte [facility]. \par Knee chest position was used. Digital rectal exam found no suspicious rectal masses. No rectal mucosal lesions. Anal tone is normal. The prostate is non tender, with normal texture, discrete borders, and no nodules. It is a 45 gram transurethral resection size. No gross blood on examining fingers. \par PVR 348ml after urination.  after repeat urination. \par Advised the patient to routinely double void. \par Patient will continue taking Tamsulosin 0.4mg BID and will call office in 2 weeks to report status of urination.\par RTO in 3 months for follow up. \par \par 2021: The patient presents today for a follow up. Patient appears concerned and very anxious although reports he does not usually get like this.Since his infection, he doesn't feel like he used to and reports feeling overall different which has been overwhelming. He used to wake up at night feeling an urge but now doesn't. Voids about 8-9x a day. Still has urinary frequency but denies urinary urgency. He reports drinking an adequate amount of liquids. Reports his father had prostate ca. His father had radiation seeds at 78 y/o that broke up and would cause blockages, causing his father a lot of pain, burning, and infections. Patient seems worried about going through the same that his father went through. The patient discontinued Pyridium 200 mg 1 tablet 3x a day after meals for burning. He was taking tamsulosin 0.4 mg 2x daily, however he went down to 1 after having ejaculation contraction problems. Before the pandemic he reports being sexually active. His libido is still good, however he reports that his erections are not as adequate as he'd like. He has high blood pressure and cholesterol. He works as a  for the "[employer]. His last PSA was 3.79 on 10/20/2020 which is acceptable to watch. PVR is 171. \par \par 2021: Patient presents today for a telehealth visit for which he gave permission for. The patient was anxious today and apologized if he was asking questions that were detailed but he was very concerned since his father  at age 91, had pros ca and radioactive seed implants at 78 y/o and had many complications in terms of seeds entering the urethra and bladder causing pain, difficulties, and urinary symptoms. The patient said the seeds had broken up. The patient has been on tamsulosin once daily. He finds it optimal in helping some of his urinary symptoms, however he still does have a lot of symptoms. He had a UTI during my care and since then he no longer has the same sensation when he needs to urinate. He has a sense but no urge specifically at night. He finds that his stream sprays. He is going more frequently, every 2-3 hours which is bothersome to him. He reports his stream is weaker at night since the UTI. He was distressed that I wasn’t ordering an MRI of the prostate for him, however I explained that I would write for one but he would not meet the insurance criteria so it most likely would not be authorized and that the current price was about 800$. Much of what he wanted an MRI for was not for detection of prostate cancer but he wanted a better understanding of anatomical problems. I agreed with him that an MRI would provide a better estimation of size and a better knowledge of the prostae than a regular digital rectal exam would, however I informed him that we can get valuable information as well from a transrectal US in  terms of size and configuration. I also informed him that we can get information about configuration from cystoscopy as well . The patient was agreeable that he'd rather get the transrectal US before allotting for an MRI. His current copay for a telehealth visit is about 50$ and he informed me that he was looking not to come in or have a telehealth visit due to expenses involved since his financial means are limited at this current time. He'd be interested in an in person visit in October and would do the transrectal US at that time. I also asked him about blood work. I offered to put in the computer to do ahead of time but he was content on doing it the same day he came in. I talked about procedures to improve urination if medication management failed. I said since we are having difficulties with medical management of symptoms that we could do urodynamics and cystoscopy to better understand the anatomy and physiology relative to his difficulties. I explained the procedure in detail. Review risks in addition to the benefits. I informed him of possible risk of infection and discomfort. Reviewed procedures for improving urination by decreasing bladder outlet obstruction due to BPH. Discussed steam and water techniques  We spoke about electrosurgical techniques and laser techniques for dealing with BPH resulting in bladder outlet obstruction. The patient has not been taking tadalafil ever day. He tried it only on days he wanted to have sex which he reports did help but he is not sexually active at the moment. He was paying 20$ at his local [facility] [employer], which I thought was acceptable. I advised him to check the price if he refills it as we can find him a place to charge him less if they are charging him more than 20$. He didn't want 30 pills since he is not active however I informed him it would improve his urination if taken every day. He admitted that when he was taking it sporadically it did help his urination some. I reviewed thoughts behind taking it every day. I believe his agreement to try it was sincere, placed a new prescription with refills. As noted above, I placed orders for a transrectal US to be scheduled on or around 10/6/2021. I told the patient that when he arrives on the day of, he should go first to give blood in our lab then have the US. Pt understands this.\par \par 2021: Patient presents today for prostate US. Prostate US results show normal echogenicity. Prostatic calcifications were visualized. No masses visualized. No obstruction of ejaculatory ducts. Seminal vesicles are normal size with no blood in it. +FHx of prostate cancer in father who was dx at 78y/o. Had radioactive seeds which caused pain and problems in him in that seeds broke up at as gravel. States ever since starting Tadalafil 5mg with Tamsulosin 0.4mg once daily half hour after dinner, feels he is ejaculating more. States he in the past took Tamsulosin twice daily, but was disturbed that it affected his ejaculate. States he had been celibate since the pandemic, but is interesting in returning. Since taking Tadalafil, has been ejaculating more and feels more satisfied when he masturbates. Strength of erections vary. Additionally, patient states he must urinate every 3-4 hours. Wakes 2x at night to urinate. States that since UTI he had 13 months ago, feels the urge to urinate has not been as severe but feels stream has been weaker since then. Dealing with some stress as mother is having speech problems s/p stroke and sees speech pathologist. \par \par 2021: Patient presents today for a follow up telephone visit for which he gave permission for. Currently on tadalafil 5 mg once daily and tamsulosin 0.4 mg once daily. Patient had lab work done on 2021. UA was good. Blood work demonstrated a PSA of 2.08. Estradiol level, prolactin, LH, and calcium were all normal. Creatinine was 0.9. Glucose was 106. Electrolytes and Alk phos were normal. Urine cytology was benign. Urine culture was no growth. Testosterone free was normal at 10.8, testosterone total was slightly low at 235, but with a normal free testosterone I would be reluctant to prescribe something to augment testosterone. Estrogens were elevated at 311 where the normal amount for males is within range of . The patient states that he has been feeling a little tired everyday around 3 pm where he feels like he could take a nap. On tamsulosin and tadalafil, his urination is improved. \par \par 2021: I attempted to call the patient at 6:56 PM. Patient was scheduled for 6:00 PM appointment. I left a voicemail stating that recent lab work shows that estrogen remains elevated and will consider starting Anastrazole to decrease estrogen levels or see an endocrinologist. He should reschedule telehealth appointment to discuss. I have provided the name of an endocrinologist Dr. Antonio Driscoll. \par \par 08/10/2022:  presents today for a telephone visit for which he gave permission for. He reports that he has been doing well but has been busy taking care of his mother who is 97. She is mostly lucid but memory is diminished. He shares the responsibility of caring for her with his brother. Pt expresses that he is discombobulated over all the phone calls he has been getting. The pt last saw me in 2021. He retired in 2022. In January he had to change his health plan. He had intention of returning this October and called my office yesterday morning. He was told he had an appt for 2022. After he hung up he got a phone call and VM requesting he get switched to a virtual appointment. He called and spoke to my  and informed her he needs to see me in person. Adeline told him she would call him back. She called back and informed him I would like to schedule a telehealth visit and then have him come in for a physical examination. He was confused by this. He then got a phone call from my office saying that we didn’t have his health plan in our system. He now has [facility]. The patient continues taking taking tamsulosin 0.4 mg once daily and tadalafil 5 mg once daily. He feels he is urinating well. At night he gets up about 2x to void. The stream is weak at night. Erections are improved since taking tadalafil 5 mg once daily. Has not been sexually active with another partner lately. He has been exercising more frequently. He walks on average 10,000 steps a day. \par \par I explained to the patient that I will be moving most of my work to telehealth visits. I explained the reasoning and rationale for this transition and why I prefer a telehealth visit prior to him coming in for physical examination and getting lab work prior. Pt understood. The patient would like to keep his 2022 visit. I was agreeable with him keeping his appointment so long as the week prior we have a telehealth visit. The pt understood and was agreeable. He understood the plan. \par \par On 2021, the patient's prolactin was elevated at 20.7. I had requested that he see an endocrinologist, however he was unable to. He informs me that if his laboratory work is done through [facility], it will not cost him much. Therefore, i am sending the patient for laboratory work at [facility]. Will recheck prolactin level.  \par \par Pt will continue taking tamsulosin 0.4 mg once daily after dinner. Will continue tadalafil 5 mg once daily. I prescribed the patient Alfuzosin ER 10 mg, once daily after breakfast. I informed the patient of lightheadedness as a side effect. It was my original recommendation to increase tamsulosin to BID, however patient states he was disturbed by the decreased semen volume when he used to take tamsulosin BID.  \par \par I informed the patient that I will check with billing regarding his health insurance and if I accept it. I assured him that I or someone I trust will be calling him back about it. If I accept his insurance there will be no problems but if I don’t he will get a list of approved providers and I will recommend him to whoever I think is best and send his records. If we accept his insurance, I will recommend the patient to a PCP, since he states he does not have one.  \par \par Duration of telephone visit: 30 minutes

## 2022-08-10 NOTE — REVIEW OF SYSTEMS
[Feeling Tired] : feeling tired [Nocturia] : nocturia [Anxiety] : anxiety [Erectile Dysfunction] : erectile dysfunction [Feeling Poorly] : not feeling poorly [Difficulty Walking] : no difficulty walking

## 2022-08-10 NOTE — HISTORY OF PRESENT ILLNESS
[FreeTextEntry1] : 10/21/19: Michael Canela is a 60 y/o male patient who presents today for an initial evaluation. For the past 4-5 years he has been seeing various urologists. He began taking Tamsulosin 0.4 mg BID to improve his urination. He became concerned when the amount of ejaculate decreased and as a result the Tamsulosin was decreased to one capsule per day. Urinary frequency is reported. He states that the stream is strong. Nocturia x 2 is reported. When he wakes up at night he states that the urinary stream is weaker than it is during the day. He believes that his urination is significantly improved since beginning the Tamsulosin. He states that his sexual function is decreased, erections occur less often. He is a  and drinks at least 2 quarts of water a day. His father was diagnosed with prostate cancer in his 70's. His father also had colon cancer. His mother was diagnosed with breast cancer. He denies a Hx and FHx of kidney stones. Currently smokes less than half a pack of cigarettes a day and is attempting to quit. \par \par 2020: 59 yr male history of LUTS for 6 yrs going to urology. Present buring , freq, fever , pain at tip of penis, supra pubic pressure started one week. Fever resolved with abx cefalexin from urgent care. Freq and buring is resolving. Freq down to 2 hrly from q 45 min. Anxious .  . PSA 1.58 as at 10/19 \par \par 10/7/2020: 59 yr male with acute  LUTs symptoms concerning for UTI ,now resolved after Levofloxacin 750mg take for 7 days and feels well today.  Doxazosin. Takes Tamsulosin 0.4mg BID. \par Said pressure and burning and pain tip of penis resolved.  Wakes 2X nocte to pee.  Freq during the day .  Feels like do not empty bladder and feels urgency in the morning hours, but stream is good and no leakage. Feels increased urgency when driving. Pt concerned about low testosterone. Erections are adequate. \par UA/Cx  show now bacterial growth.  Unremarkable \par PSA  1.58 oct 2019 \par +FHx of bladder stone in father. \par \par 2021: The patient presents today for a follow up. Patient appears concerned and very anxious although reports he does not usually get like this.Since his infection, he doesn't feel like he used to and reports feeling overall different which has been overwhelming. He used to wake up at night feeling an urge but now doesn't. Voids about 8-9x a day. Still has urinary frequency but denies urinary urgency. He reports drinking an adequate amount of liquids. Reports his father had prostate ca. His father had radiation seeds at 76 y/o that broke up and would cause blockages, causing his father a lot of pain, burning, and infections. Patient seems worried about going through the same that his father went through. The patient discontinued Pyridium 200 mg 1 tablet 3x a day after meals for burning. He was taking tamsulosin 0.4 mg 2x daily, however he went down to 1 after having ejaculation contraction problems. Before the pandemic he reports being sexually active. His libido is still good, however he reports that his erections are not as adequate as he'd like. He has high blood pressure and cholesterol. He works as a  for the [employer]. His last PSA was 3.79 on 10/20/2020 which is acceptable to watch. PVR is 171. \par \par 2021: Patient was not seen in office today. \par \par 2021: Patient presents today for a telehealth visit for which he gave permission for. The patient was anxious today and apologized if he was asking questions that were detailed but he was very concerned since his father  at age 91, had pros ca and radioactive seed implants at 76 y/o and had many complications in terms of seeds entering the urethra and bladder causing pain, difficulties, and urinary symptoms. The patient said the seeds had broken up. The patient has been on tamsulosin once daily. He finds it optimal in helping some of his urinary symptoms, however he still does have a lot of symptoms. He had a UTI during my care and since then he no longer has the same sensation when he needs to urinate. He has a sense but no urge specifically at night. He finds that his stream sprays. He is going more frequently, every 2-3 hours which is bothersome to him. He reports his stream is weaker at night since the UTI. He was distressed that I wasn’t ordering an MRI of the prostate for him, however I explained that I would write for one but he would not meet the insurance criteria so it most likely would not be authorized and that the current price was about 800$. Much of what he wanted an MRI for was not for detection of prostate cancer but he wanted a better understanding of anatomical problems. I agreed with him that an MRI would provide a better estimation of size and a better knowledge of the prostae than a regular digital rectal exam would, however I informed him that we can get valuable information as well from a transrectal US in  terms of size and configuration. I also informed him that we can get information about configuration from cystoscopy as well . The patient was agreeable that he'd rather get the transrectal US before allotting for an MRI. His current copay for a telehealth visit is about 50$ and he informed me that he was looking not to come in or have a telehealth visit due to expenses involved since his financial means are limited at this current time. He'd be interested in an in person visit in October and would do the transrectal US at that time. I also asked him about blood work. I offered to put in the computer to do ahead of time but he was content on doing it the same day he came in. I talked about procedures to improve urination if medication management failed. I said since we are having difficulties with medical management of symptoms that we could do urodynamics and cystoscopy to better understand the anatomy and physiology relative to his difficulties. I explained the procedure in detail. Review risks in addition to the benefits. I informed him of possible risk of infection and discomfort. Reviewed procedures for improving urination by decreasing bladder outlet obstruction due to BPH. Discussed steam and water techniques  We spoke about electrosurgical techniques and laser techniques for dealing with BPH resulting in bladder outlet obstruction. The patient has not been taking tadalafil ever day. He tried it only on days he wanted to have sex which he reports did help but he is not sexually active at the moment. He was paying 20$ at his local [facility], which I thought was acceptable. I advised him to check the price if he refills it as we can find him a place to charge him less if they are charging him more than 20$. He didn't want 30 pills since he is not active however I informed him it would improve his urination if taken every day. He admitted that when he was taking it sporadically it did help his urination some. I reviewed thoughts behind taking it every day. I believe his agreement to try it was sincere, placed a new prescription with refills. As noted above, I placed orders for a transrectal US to be scheduled on or around 10/6/2021. I told the patient that when he arrives on the day of, he should go first to give blood in our lab then have the US. Pt understands this. \par \par 2021: Patient presents today for prostate US. Prostate US results show normal echogenicity. Prostatic calcifications were visualized. No masses visualized. No obstruction of ejaculatory ducts. Seminal vesicles are normal size with no blood in it. +FHx of prostate cancer in father who was dx at 78y/o. Had radioactive seeds which caused pain and problems in him in that seeds broke up at as gravel. States ever since starting Tadalafil 5mg with Tamsulosin 0.4mg once daily half hour after dinner, feels he is ejaculating more. States he in the past took Tamsulosin twice daily, but was disturbed that it affected his ejaculate. States he had been celibate since the pandemic, but is interesting in returning. Since taking Tadalafil, has been ejaculating more and feels more satisfied when he masturbates. Strength of erections vary. Additionally, patient states he must urinate every 3-4 hours. Wakes 2x at night to urinate. States that since UTI he had 13 months ago, feels the urge to urinate has not been as severe. Dealing with some stress as mother is having speech problems and sees speech pathologist. \par \par 2021: Patient presents today for a follow up telephone visit for which he gave permission for. Currently on tadalafil 5 mg once daily and tamsulosin 0.4 mg once daily. Patient had lab work done on 2021. UA was good. Blood work demonstrated a PSA of 2.08. Estradiol level, prolactin, LH, and calcium were all normal. Creatinine was 0.9. Glucose was 106. Electrolytes and Alk phos were normal. Urine cytology was benign. Urine culture was no growth. Testosterone free was normal at 10.8, testosterone total was slightly low at 235, but with a normal free testosterone I would be reluctant to prescribe something to augment testosterone. Estrogens were elevated at 311 where the normal amount for males is within range of . The patient states that he has been feeling a little tired everyday around 3 pm where he feels like he could take a nap. On tamsulosin and tadalafil, his urination is improved. \par \par 2021: I attempted to call the patient at 6:56 PM. Patient was scheduled for 6:00 PM appointment. I left a voicemail stating that recent lab work shows that estrogen remains elevated and will consider starting Anastrazole to decrease estrogen levels or see an endocrinologist. He should reschedule telehealth appointment to discuss. I have provided the name of an endocrinologist Dr. Antonio Driscoll. \par \par 08/10/2022:  presents today for a telephone visit for which he gave permission for. He reports that he has been doing well but has been busy taking care of his mother who is 97. She is mostly lucid but memory is diminished. He shares the responsibility of caring for her with his brother. Pt expresses that he is discombobulated over all the phone calls he has been getting. The pt last saw me in 2021. He retired in 2022. In January he had to change his health plan. He had intention of returning this October and called my office yesterday morning. He was told he had an appt for 2022. After he hung up he got a phone call and VM requesting he get switched to a virtual appointment. He called and spoke to my  and informed her he needs to see me in person. Adeline told him she would call him back. She called back and informed him I would like to schedule a telehealth visit and then have him come in for a physical examination. He was confused by this. He then got a phone call from my office saying that we didn’t have his health plan in our system. He now has [facility]. The patient continues taking taking tamsulosin 0.4 mg once daily and tadalafil 5 mg once daily. He feels he is urinating well. At night he gets up about 2x to void. The stream is weak at night. Erections are improved since taking tadalafil 5 mg once daily. Has not been sexually active with another partner lately. He has been exercising more frequently. He walks on average 10,000 steps a day.

## 2022-08-10 NOTE — ADDENDUM
[FreeTextEntry1] : This note was authored by Kalpana Herrera working as a scribe for Dr.Gary Leggett. I, Dr. Chris Leggett have reviewed the content of this note and confirm it is true and accurate. I personally performed the history and physical examination and made all the decisions 08/10/2022.

## 2022-08-10 NOTE — HISTORY OF PRESENT ILLNESS
[FreeTextEntry1] : 10/21/19: Michael Canela is a 61 y/o male patient who presents today for an initial evaluation. For the past 4-5 years he has been seeing various urologists. He began taking Tamsulosin 0.4 mg BID to improve his urination. He became concerned when the amount of ejaculate decreased and as a result the Tamsulosin was decreased to one capsule per day. Urinary frequency is reported. He states that the stream is strong. Nocturia x 2 is reported. When he wakes up at night he states that the urinary stream is weaker than it is during the day. He believes that his urination is significantly improved since beginning the Tamsulosin. He states that his sexual function is decreased, erections occur less often. He is a  and drinks at least 2 quarts of water a day. His father was diagnosed with prostate cancer in his 70's. His father also had colon cancer. His mother was diagnosed with breast cancer. He denies a Hx and FHx of kidney stones. Currently smokes less than half a pack of cigarettes a day and is attempting to quit. \par \par 2020: 59 yr male history of LUTS for 6 yrs going to urology. Present buring , freq, fever , pain at tip of penis, supra pubic pressure started one week. Fever resolved with abx cefalexin from urgent care. Freq and buring is resolving. Freq down to 2 hrly from q 45 min. Anxious .  . PSA 1.58 as at 10/19 \par \par 10/7/2020: 59 yr male with acute  LUTs symptoms concerning for UTI ,now resolved after Levofloxacin 750mg take for 7 days and feels well today.  Doxazosin. Takes Tamsulosin 0.4mg BID. \par Said pressure and burning and pain tip of penis resolved.  Wakes 2X nocte to pee.  Freq during the day .  Feels like do not empty bladder and feels urgency in the morning hours, but stream is good and no leakage. Feels increased urgency when driving. Pt concerned about low testosterone. Erections are adequate. \par UA/Cx  show now bacterial growth.  Unremarkable \par PSA  1.58 oct 2019 \par +FHx of bladder stone in father. \par \par 2021: The patient presents today for a follow up. Patient appears concerned and very anxious although reports he does not usually get like this.Since his infection, he doesn't feel like he used to and reports feeling overall different which has been overwhelming. He used to wake up at night feeling an urge but now doesn't. Voids about 8-9x a day. Still has urinary frequency but denies urinary urgency. He reports drinking an adequate amount of liquids. Reports his father had prostate ca. His father had radiation seeds at 78 y/o that broke up and would cause blockages, causing his father a lot of pain, burning, and infections. Patient seems worried about going through the same that his father went through. The patient discontinued Pyridium 200 mg 1 tablet 3x a day after meals for burning. He was taking tamsulosin 0.4 mg 2x daily, however he went down to 1 after having ejaculation contraction problems. Before the pandemic he reports being sexually active. His libido is still good, however he reports that his erections are not as adequate as he'd like. He has high blood pressure and cholesterol. He works as a  for the [employer]. His last PSA was 3.79 on 10/20/2020 which is acceptable to watch. PVR is 171. \par \par 2021: Patient was not seen in office today. \par \par 2021: Patient presents today for a telehealth visit for which he gave permission for. The patient was anxious today and apologized if he was asking questions that were detailed but he was very concerned since his father  at age 91, had pros ca and radioactive seed implants at 78 y/o and had many complications in terms of seeds entering the urethra and bladder causing pain, difficulties, and urinary symptoms. The patient said the seeds had broken up. The patient has been on tamsulosin once daily. He finds it optimal in helping some of his urinary symptoms, however he still does have a lot of symptoms. He had a UTI during my care and since then he no longer has the same sensation when he needs to urinate. He has a sense but no urge specifically at night. He finds that his stream sprays. He is going more frequently, every 2-3 hours which is bothersome to him. He reports his stream is weaker at night since the UTI. He was distressed that I wasn’t ordering an MRI of the prostate for him, however I explained that I would write for one but he would not meet the insurance criteria so it most likely would not be authorized and that the current price was about 800$. Much of what he wanted an MRI for was not for detection of prostate cancer but he wanted a better understanding of anatomical problems. I agreed with him that an MRI would provide a better estimation of size and a better knowledge of the prostae than a regular digital rectal exam would, however I informed him that we can get valuable information as well from a transrectal US in  terms of size and configuration. I also informed him that we can get information about configuration from cystoscopy as well . The patient was agreeable that he'd rather get the transrectal US before allotting for an MRI. His current copay for a telehealth visit is about 50$ and he informed me that he was looking not to come in or have a telehealth visit due to expenses involved since his financial means are limited at this current time. He'd be interested in an in person visit in October and would do the transrectal US at that time. I also asked him about blood work. I offered to put in the computer to do ahead of time but he was content on doing it the same day he came in. I talked about procedures to improve urination if medication management failed. I said since we are having difficulties with medical management of symptoms that we could do urodynamics and cystoscopy to better understand the anatomy and physiology relative to his difficulties. I explained the procedure in detail. Review risks in addition to the benefits. I informed him of possible risk of infection and discomfort. Reviewed procedures for improving urination by decreasing bladder outlet obstruction due to BPH. Discussed steam and water techniques  We spoke about electrosurgical techniques and laser techniques for dealing with BPH resulting in bladder outlet obstruction. The patient has not been taking tadalafil ever day. He tried it only on days he wanted to have sex which he reports did help but he is not sexually active at the moment. He was paying 20$ at his local [facility], which I thought was acceptable. I advised him to check the price if he refills it as we can find him a place to charge him less if they are charging him more than 20$. He didn't want 30 pills since he is not active however I informed him it would improve his urination if taken every day. He admitted that when he was taking it sporadically it did help his urination some. I reviewed thoughts behind taking it every day. I believe his agreement to try it was sincere, placed a new prescription with refills. As noted above, I placed orders for a transrectal US to be scheduled on or around 10/6/2021. I told the patient that when he arrives on the day of, he should go first to give blood in our lab then have the US. Pt understands this. \par \par 2021: Patient presents today for prostate US. Prostate US results show normal echogenicity. Prostatic calcifications were visualized. No masses visualized. No obstruction of ejaculatory ducts. Seminal vesicles are normal size with no blood in it. +FHx of prostate cancer in father who was dx at 78y/o. Had radioactive seeds which caused pain and problems in him in that seeds broke up at as gravel. States ever since starting Tadalafil 5mg with Tamsulosin 0.4mg once daily half hour after dinner, feels he is ejaculating more. States he in the past took Tamsulosin twice daily, but was disturbed that it affected his ejaculate. States he had been celibate since the pandemic, but is interesting in returning. Since taking Tadalafil, has been ejaculating more and feels more satisfied when he masturbates. Strength of erections vary. Additionally, patient states he must urinate every 3-4 hours. Wakes 2x at night to urinate. States that since UTI he had 13 months ago, feels the urge to urinate has not been as severe. Dealing with some stress as mother is having speech problems and sees speech pathologist. \par \par 2021: Patient presents today for a follow up telephone visit for which he gave permission for. Currently on tadalafil 5 mg once daily and tamsulosin 0.4 mg once daily. Patient had lab work done on 2021. UA was good. Blood work demonstrated a PSA of 2.08. Estradiol level, prolactin, LH, and calcium were all normal. Creatinine was 0.9. Glucose was 106. Electrolytes and Alk phos were normal. Urine cytology was benign. Urine culture was no growth. Testosterone free was normal at 10.8, testosterone total was slightly low at 235, but with a normal free testosterone I would be reluctant to prescribe something to augment testosterone. Estrogens were elevated at 311 where the normal amount for males is within range of . The patient states that he has been feeling a little tired everyday around 3 pm where he feels like he could take a nap. On tamsulosin and tadalafil, his urination is improved. \par \par 2021: I attempted to call the patient at 6:56 PM. Patient was scheduled for 6:00 PM appointment. I left a voicemail stating that recent lab work shows that estrogen remains elevated and will consider starting Anastrazole to decrease estrogen levels or see an endocrinologist. He should reschedule telehealth appointment to discuss. I have provided the name of an endocrinologist Dr. Antonio Driscoll.

## 2022-08-10 NOTE — ASSESSMENT
[FreeTextEntry1] : 10/21/19: Michael Canela is a 62 y/o male patient who presents today for an initial evaluation. For the past 4-5 years he has been seeing various urologists. He began taking Tamsulosin 0.4 mg BID to improve his urination. He became concerned when the amount of ejaculate decreased and as a result the Tamsulosin was decreased to one capsule per day. Urinary frequency is reported. He states that the stream is strong. Nocturia x 2 is reported. When he wakes up at night he states that the urinary stream is weaker than it is during the day. He believes that his urination is significantly improved since beginning the Tamsulosin. He states that his sexual function is decreased, erections occur less often. He is a  and drinks at least 2 quarts of water a day. His father was diagnosed with prostate cancer in his 70's. His father also had colon cancer. His mother was diagnosed with breast cancer. He denies a Hx and FHx of kidney stones. Currently smokes less than half a pack of cigarettes a day and is attempting to quit. \par \par 2020: 59 yr male with LUTS presents dysuria , freq and recent fever and chill, resolving with abx cefalexin \par \par \par 10/07/2020: 59 yr male Acute LUTs possible uti , resolved with levaquin, Doxazosin \par to repeat ua micros\par PSA afte MORENA. \par Knee chest position was used. Digital rectal exam found no suspicious rectal masses. No rectal mucosal lesions. Anal tone is normal. The prostate is non tender, with normal texture, discrete borders, and no nodules. It is a 45 gram transurethral resection size. No gross blood on examining fingers. \par PVR 348ml after urination.  after repeat urination. \par Advised the patient to routinely double void. \par Patient will continue taking Tamsulosin 0.4mg BID and will call office in 2 weeks to report status of urination.\par RTO in 3 months for follow up. \par \par 2021: The patient presents today for a follow up. Patient appears concerned and very anxious although reports he does not usually get like this.Since his infection, he doesn't feel like he used to and reports feeling overall different which has been overwhelming. He used to wake up at night feeling an urge but now doesn't. Voids about 8-9x a day. Still has urinary frequency but denies urinary urgency. He reports drinking an adequate amount of liquids. Reports his father had prostate ca. His father had radiation seeds at 78 y/o that broke up and would cause blockages, causing his father a lot of pain, burning, and infections. Patient seems worried about going through the same that his father went through. The patient discontinued Pyridium 200 mg 1 tablet 3x a day after meals for burning. He was taking tamsulosin 0.4 mg 2x daily, however he went down to 1 after having ejaculation contraction problems. Before the pandemic he reports being sexually active. His libido is still good, however he reports that his erections are not as adequate as he'd like. He has high blood pressure and cholesterol. He works as a  for the [employer]. His last PSA was 3.79 on 10/20/2020 which is acceptable to watch. PVR is 171. \par \par 2021: Patient presents today for a telehealth visit for which he gave permission for. The patient was anxious today and apologized if he was asking questions that were detailed but he was very concerned since his father  at age 91, had pros ca and radioactive seed implants at 78 y/o and had many complications in terms of seeds entering the urethra and bladder causing pain, difficulties, and urinary symptoms. The patient said the seeds had broken up. The patient has been on tamsulosin once daily. He finds it optimal in helping some of his urinary symptoms, however he still does have a lot of symptoms. He had a UTI during my care and since then he no longer has the same sensation when he needs to urinate. He has a sense but no urge specifically at night. He finds that his stream sprays. He is going more frequently, every 2-3 hours which is bothersome to him. He reports his stream is weaker at night since the UTI. He was distressed that I wasn’t ordering an MRI of the prostate for him, however I explained that I would write for one but he would not meet the insurance criteria so it most likely would not be authorized and that the current price was about 800$. Much of what he wanted an MRI for was not for detection of prostate cancer but he wanted a better understanding of anatomical problems. I agreed with him that an MRI would provide a better estimation of size and a better knowledge of the prostae than a regular digital rectal exam would, however I informed him that we can get valuable information as well from a transrectal US in  terms of size and configuration. I also informed him that we can get information about configuration from cystoscopy as well . The patient was agreeable that he'd rather get the transrectal US before allotting for an MRI. His current copay for a telehealth visit is about 50$ and he informed me that he was looking not to come in or have a telehealth visit due to expenses involved since his financial means are limited at this current time. He'd be interested in an in person visit in October and would do the transrectal US at that time. I also asked him about blood work. I offered to put in the computer to do ahead of time but he was content on doing it the same day he came in. I talked about procedures to improve urination if medication management failed. I said since we are having difficulties with medical management of symptoms that we could do urodynamics and cystoscopy to better understand the anatomy and physiology relative to his difficulties. I explained the procedure in detail. Review risks in addition to the benefits. I informed him of possible risk of infection and discomfort. Reviewed procedures for improving urination by decreasing bladder outlet obstruction due to BPH. Discussed steam and water techniques  We spoke about electrosurgical techniques and laser techniques for dealing with BPH resulting in bladder outlet obstruction. The patient has not been taking tadalafil ever day. He tried it only on days he wanted to have sex which he reports did help but he is not sexually active at the moment. He was paying 20$ at his local [facility], which I thought was acceptable. I advised him to check the price if he refills it as we can find him a place to charge him less if they are charging him more than 20$. He didn't want 30 pills since he is not active however I informed him it would improve his urination if taken every day. He admitted that when he was taking it sporadically it did help his urination some. I reviewed thoughts behind taking it every day. I believe his agreement to try it was sincere, placed a new prescription with refills. As noted above, I placed orders for a transrectal US to be scheduled on or around 10/6/2021. I told the patient that when he arrives on the day of, he should go first to give blood in our lab then have the US. Pt understands this.\par \par 2021: Patient presents today for prostate US. Prostate US results show normal echogenicity. Prostatic calcifications were visualized. No masses visualized. No obstruction of ejaculatory ducts. Seminal vesicles are normal size with no blood in it. +FHx of prostate cancer in father who was dx at 78y/o. Had radioactive seeds which caused pain and problems in him in that seeds broke up at as gravel. States ever since starting Tadalafil 5mg with Tamsulosin 0.4mg once daily half hour after dinner, feels he is ejaculating more. States he in the past took Tamsulosin twice daily, but was disturbed that it affected his ejaculate. States he had been celibate since the pandemic, but is interesting in returning. Since taking Tadalafil, has been ejaculating more and feels more satisfied when he masturbates. Strength of erections vary. Additionally, patient states he must urinate every 3-4 hours. Wakes 2x at night to urinate. States that since UTI he had 13 months ago, feels the urge to urinate has not been as severe but feels stream has been weaker since then. Dealing with some stress as mother is having speech problems s/p stroke and sees speech pathologist. \par \par 2021: Patient presents today for a follow up telephone visit for which he gave permission for. Currently on tadalafil 5 mg once daily and tamsulosin 0.4 mg once daily. Patient had lab work done on 2021. UA was good. Blood work demonstrated a PSA of 2.08. Estradiol level, prolactin, LH, and calcium were all normal. Creatinine was 0.9. Glucose was 106. Electrolytes and Alk phos were normal. Urine cytology was benign. Urine culture was no growth. Testosterone free was normal at 10.8, testosterone total was slightly low at 235, but with a normal free testosterone I would be reluctant to prescribe something to augment testosterone. Estrogens were elevated at 311 where the normal amount for males is within range of . The patient states that he has been feeling a little tired everyday around 3 pm where he feels like he could take a nap. On tamsulosin and tadalafil, his urination is improved. \par \par 2021: I attempted to call the patient at 6:56 PM. Patient was scheduled for 6:00 PM appointment. I left a voicemail stating that recent lab work shows that estrogen remains elevated and will consider starting Anastrazole to decrease estrogen levels or see an endocrinologist. He should reschedule telehealth appointment to discuss. I have provided the name of an endocrinologist Dr. Antonio Driscoll.

## 2022-10-26 ENCOUNTER — APPOINTMENT (OUTPATIENT)
Dept: UROLOGY | Facility: CLINIC | Age: 62
End: 2022-10-26

## 2022-10-26 PROCEDURE — 99443: CPT

## 2022-10-30 NOTE — ADDENDUM
[FreeTextEntry1] : This note was authored by Kalpana Herrera working as a scribe for Dr.Gary Leggett. I, Dr. Chris Leggett have reviewed the content of this note and confirm it is true and accurate. I personally performed the history and physical examination and made all the decisions 10/26/2022

## 2022-10-30 NOTE — ASSESSMENT
[FreeTextEntry1] : 10/21/19: Michael Canela is a 60 y/o male patient who presents today for an initial evaluation. For the past 4-5 years he has been seeing various urologists. He began taking Tamsulosin 0.4 mg BID to improve his urination. He became concerned when the amount of ejaculate decreased and as a result the Tamsulosin was decreased to one capsule per day. Urinary frequency is reported. He states that the stream is strong. Nocturia x 2 is reported. When he wakes up at night he states that the urinary stream is weaker than it is during the day. He believes that his urination is significantly improved since beginning the Tamsulosin. He states that his sexual function is decreased, erections occur less often. He is a  and drinks at least 2 quarts of water a day. His father was diagnosed with prostate cancer in his 70's. His father also had colon cancer. His mother was diagnosed with breast cancer. He denies a Hx and FHx of kidney stones. Currently smokes less than half a pack of cigarettes a day and is attempting to quit. \par \par 2020: 59 yr male with LUTS presents dysuria , freq and recent fever and chill, resolving with abx cefalexin \par \par \par 10/07/2020: 59 yr male Acute LUTs possible uti , resolved with levaquin, Doxazosin \par to repeat ua micros\par PSA afte MORENA. \par Knee chest position was used. Digital rectal exam found no suspicious rectal masses. No rectal mucosal lesions. Anal tone is normal. The prostate is non tender, with normal texture, discrete borders, and no nodules. It is a 45 gram transurethral resection size. No gross blood on examining fingers. \par PVR 348ml after urination.  after repeat urination. \par Advised the patient to routinely double void. \par Patient will continue taking Tamsulosin 0.4mg BID and will call office in 2 weeks to report status of urination.\par RTO in 3 months for follow up. \par \par 2021: The patient presents today for a follow up. Patient appears concerned and very anxious although reports he does not usually get like this.Since his infection, he doesn't feel like he used to and reports feeling overall different which has been overwhelming. He used to wake up at night feeling an urge but now doesn't. Voids about 8-9x a day. Still has urinary frequency but denies urinary urgency. He reports drinking an adequate amount of liquids. Reports his father had prostate ca. His father had radiation seeds at 76 y/o that broke up and would cause blockages, causing his father a lot of pain, burning, and infections. Patient seems worried about going through the same that his father went through. The patient discontinued Pyridium 200 mg 1 tablet 3x a day after meals for burning. He was taking tamsulosin 0.4 mg 2x daily, however he went down to 1 after having ejaculation contraction problems. Before the pandemic he reports being sexually active. His libido is still good, however he reports that his erections are not as adequate as he'd like. He has high blood pressure and cholesterol. He works as a  for the [employer]. His last PSA was 3.79 on 10/20/2020 which is acceptable to watch. PVR is 171. \par \par 2021: Patient presents today for a telehealth visit for which he gave permission for. The patient was anxious today and apologized if he was asking questions that were detailed but he was very concerned since his father  at age 91, had pros ca and radioactive seed implants at 76 y/o and had many complications in terms of seeds entering the urethra and bladder causing pain, difficulties, and urinary symptoms. The patient said the seeds had broken up. The patient has been on tamsulosin once daily. He finds it optimal in helping some of his urinary symptoms, however he still does have a lot of symptoms. He had a UTI during my care and since then he no longer has the same sensation when he needs to urinate. He has a sense but no urge specifically at night. He finds that his stream sprays. He is going more frequently, every 2-3 hours which is bothersome to him. He reports his stream is weaker at night since the UTI. He was distressed that I wasn’t ordering an MRI of the prostate for him, however I explained that I would write for one but he would not meet the insurance criteria so it most likely would not be authorized and that the current price was about 800$. Much of what he wanted an MRI for was not for detection of prostate cancer but he wanted a better understanding of anatomical problems. I agreed with him that an MRI would provide a better estimation of size and a better knowledge of the prostae than a regular digital rectal exam would, however I informed him that we can get valuable information as well from a transrectal US in  terms of size and configuration. I also informed him that we can get information about configuration from cystoscopy as well . The patient was agreeable that he'd rather get the transrectal US before allotting for an MRI. His current copay for a telehealth visit is about 50$ and he informed me that he was looking not to come in or have a telehealth visit due to expenses involved since his financial means are limited at this current time. He'd be interested in an in person visit in October and would do the transrectal US at that time. I also asked him about blood work. I offered to put in the computer to do ahead of time but he was content on doing it the same day he came in. I talked about procedures to improve urination if medication management failed. I said since we are having difficulties with medical management of symptoms that we could do urodynamics and cystoscopy to better understand the anatomy and physiology relative to his difficulties. I explained the procedure in detail. Review risks in addition to the benefits. I informed him of possible risk of infection and discomfort. Reviewed procedures for improving urination by decreasing bladder outlet obstruction due to BPH. Discussed steam and water techniques  We spoke about electrosurgical techniques and laser techniques for dealing with BPH resulting in bladder outlet obstruction. The patient has not been taking tadalafil ever day. He tried it only on days he wanted to have sex which he reports did help but he is not sexually active at the moment. He was paying 20$ at his local [facility], which I thought was acceptable. I advised him to check the price if he refills it as we can find him a place to charge him less if they are charging him more than 20$. He didn't want 30 pills since he is not active however I informed him it would improve his urination if taken every day. He admitted that when he was taking it sporadically it did help his urination some. I reviewed thoughts behind taking it every day. I believe his agreement to try it was sincere, placed a new prescription with refills. As noted above, I placed orders for a transrectal US to be scheduled on or around 10/6/2021. I told the patient that when he arrives on the day of, he should go first to give blood in our lab then have the US. Pt understands this.\par \par 2021: Patient presents today for prostate US. Prostate US results show normal echogenicity. Prostatic calcifications were visualized. No masses visualized. No obstruction of ejaculatory ducts. Seminal vesicles are normal size with no blood in it. +FHx of prostate cancer in father who was dx at 78y/o. Had radioactive seeds which caused pain and problems in him in that seeds broke up at as gravel. States ever since starting Tadalafil 5mg with Tamsulosin 0.4mg once daily half hour after dinner, feels he is ejaculating more. States he in the past took Tamsulosin twice daily, but was disturbed that it affected his ejaculate. States he had been celibate since the pandemic, but is interesting in returning. Since taking Tadalafil, has been ejaculating more and feels more satisfied when he masturbates. Strength of erections vary. Additionally, patient states he must urinate every 3-4 hours. Wakes 2x at night to urinate. States that since UTI he had 13 months ago, feels the urge to urinate has not been as severe but feels stream has been weaker since then. Dealing with some stress as mother is having speech problems s/p stroke and sees speech pathologist. \par \par 2021: Patient presents today for a follow up telephone visit for which he gave permission for. Currently on tadalafil 5 mg once daily and tamsulosin 0.4 mg once daily. Patient had lab work done on 2021. UA was good. Blood work demonstrated a PSA of 2.08. Estradiol level, prolactin, LH, and calcium were all normal. Creatinine was 0.9. Glucose was 106. Electrolytes and Alk phos were normal. Urine cytology was benign. Urine culture was no growth. Testosterone free was normal at 10.8, testosterone total was slightly low at 235, but with a normal free testosterone I would be reluctant to prescribe something to augment testosterone. Estrogens were elevated at 311 where the normal amount for males is within range of . The patient states that he has been feeling a little tired everyday around 3 pm where he feels like he could take a nap. On tamsulosin and tadalafil, his urination is improved. \par \par 2021: I attempted to call the patient at 6:56 PM. Patient was scheduled for 6:00 PM appointment. I left a voicemail stating that recent lab work shows that estrogen remains elevated and will consider starting Anastrazole to decrease estrogen levels or see an endocrinologist. He should reschedule telehealth appointment to discuss. I have provided the name of an endocrinologist Dr. Antonio Driscoll. \par \par 08/10/2022:  presents today for a telephone visit for which he gave permission for. He reports that he has been doing well but has been busy taking care of his mother who is 97. She is mostly lucid but memory is diminished. He shares the responsibility of caring for her with his brother. Pt expresses that he is discombobulated over all the phone calls he has been getting. The pt last saw me in 2021. He retired in 2022. In January he had to change his health plan. He had intention of returning this October and called my office yesterday morning. He was told he had an appt for 2022. After he hung up he got a phone call and VM requesting he get switched to a virtual appointment. He called and spoke to my  and informed her he needs to see me in person. Adeline told him she would call him back. She called back and informed him I would like to schedule a telehealth visit and then have him come in for a physical examination. He was confused by this. He then got a phone call from my office saying that we didn’t have his health plan in our system. He now has [employer]. The patient continues taking taking tamsulosin 0.4 mg once daily and tadalafil 5 mg once daily. He feels he is urinating well. At night he gets up about 2x to void. The stream is weak at night. Erections are improved since taking tadalafil 5 mg once daily. Has not been sexually active with another partner lately. He has been exercising more frequently. He walks on average 10,000 steps a day. \par \par I explained to the patient that I will be moving most of my work to telehealth visits. I explained the reasoning and rationale for this transition and why I prefer a telehealth visit prior to him coming in for physical examination and getting lab work prior. Pt understood. The patient would like to keep his 2022 visit. I was agreeable with him keeping his appointment so long as the week prior we have a telehealth visit. The pt understood and was agreeable. He understood the plan. \par On 2021, the patient's prolactin was elevated at 20.7. I had requested that he see an endocrinologist, however he was unable to. He informs me that if his laboratory work is done through [facility], it will not cost him much. Therefore, i am sending the patient for laboratory work at [facility]. Will recheck prolactin level.  \par Pt will continue taking tamsulosin 0.4 mg once daily after dinner. Will continue tadalafil 5 mg once daily. I prescribed the patient Alfuzosin ER 10 mg, once daily after breakfast. I informed the patient of lightheadedness as a side effect. It was my original recommendation to increase tamsulosin to BID, however patient states he was disturbed by the decreased semen volume when he used to take tamsulosin BID.  \par I informed the patient that I will check with billing regarding his health insurance and if I accept it. I assured him that I or someone I trust will be calling him back about it. If I accept his insurance there will be no problems but if I don’t he will get a list of approved providers and I will recommend him to whoever I think is best and send his records. If we accept his insurance, I will recommend the patient to a PCP, since he states he does not have one.  \par \par 10/26/2022:  presents today for a follow up audio only telephone visit for which he gave permission for. Pt currently takes tadalafil 5 mg once daily, tamsulosin 0.4 mg once daily, and alfuzosin ER 10 mg once daily. The substitution of alfuzosin for one tamsulosin was to decrease the impact on his semen volume. He reports that his semen volume is improved. He reports that his urination has been improved by these medications. Some nights he wakes 2-3x to urinate and others he can go 4-5 hours without waking up. Certain nights his stream is powerful and other nights he finds it to be more of a trickle. Ever since he has been taking these medications, his urinary urgency has gone away. He denies any ill effects from any of the medications. He is now retired from being a . He retired in February. He now has a part time job. Since this part time job he has been taking the tamsulosin much later at night and not after a meal. Pt states that when he stands to urinate, it can get messy due to spray and deviating stream. \par \par Reviewed and discussed laboratory work of 8/15/2022 in detail with the patient. Creatinine was 0.87. PSA  was 1.7 with a percent free of 0.4. \par \par I advised the patient to take tamsulosin 0.4 mg once daily about 15-20 minutes after a meal for better absorption. I advised him to bring a snack with him to his part time job so he can take it. Pt was agreeable. Will continue alfuzosin ER 10 mg once daily and tadalafil 5 mg once daily. Prescriptions were renewed. \par \par Advised the patient to sit when he urinates to avoid making a mess from a spray stream. I will examine urethral meatus when he comes in person. Will do a bladder scan as well. \par \par Pt inquired about medications that shrink the prostate. We discussed the possibility of finasteride and dutasteride as well as their ill effects. \par \par Pt is scheduled to see me for an in person office visit 2022 for MORENA, PE of urethral meatus, and bladder scan. \par \par Duration of telephone visit: 30 minutes.

## 2022-10-30 NOTE — HISTORY OF PRESENT ILLNESS
[FreeTextEntry1] : 10/21/19: Michael Canela is a 62 y/o male patient who presents today for an initial evaluation. For the past 4-5 years he has been seeing various urologists. He began taking Tamsulosin 0.4 mg BID to improve his urination. He became concerned when the amount of ejaculate decreased and as a result the Tamsulosin was decreased to one capsule per day. Urinary frequency is reported. He states that the stream is strong. Nocturia x 2 is reported. When he wakes up at night he states that the urinary stream is weaker than it is during the day. He believes that his urination is significantly improved since beginning the Tamsulosin. He states that his sexual function is decreased, erections occur less often. He is a  and drinks at least 2 quarts of water a day. His father was diagnosed with prostate cancer in his 70's. His father also had colon cancer. His mother was diagnosed with breast cancer. He denies a Hx and FHx of kidney stones. Currently smokes less than half a pack of cigarettes a day and is attempting to quit. \par \par 2020: 59 yr male history of LUTS for 6 yrs going to urology. Present buring , freq, fever , pain at tip of penis, supra pubic pressure started one week. Fever resolved with abx cefalexin from urgent care. Freq and buring is resolving. Freq down to 2 hrly from q 45 min. Anxious .  . PSA 1.58 as at 10/19 \par \par 10/7/2020: 59 yr male with acute  LUTs symptoms concerning for UTI ,now resolved after Levofloxacin 750mg take for 7 days and feels well today.  Doxazosin. Takes Tamsulosin 0.4mg BID. \par Said pressure and burning and pain tip of penis resolved.  Wakes 2X nocte to pee.  Freq during the day .  Feels like do not empty bladder and feels urgency in the morning hours, but stream is good and no leakage. Feels increased urgency when driving. Pt concerned about low testosterone. Erections are adequate. \par UA/Cx  show now bacterial growth.  Unremarkable \par PSA  1.58 oct 2019 \par +FHx of bladder stone in father. \par \par 2021: The patient presents today for a follow up. Patient appears concerned and very anxious although reports he does not usually get like this.Since his infection, he doesn't feel like he used to and reports feeling overall different which has been overwhelming. He used to wake up at night feeling an urge but now doesn't. Voids about 8-9x a day. Still has urinary frequency but denies urinary urgency. He reports drinking an adequate amount of liquids. Reports his father had prostate ca. His father had radiation seeds at 76 y/o that broke up and would cause blockages, causing his father a lot of pain, burning, and infections. Patient seems worried about going through the same that his father went through. The patient discontinued Pyridium 200 mg 1 tablet 3x a day after meals for burning. He was taking tamsulosin 0.4 mg 2x daily, however he went down to 1 after having ejaculation contraction problems. Before the pandemic he reports being sexually active. His libido is still good, however he reports that his erections are not as adequate as he'd like. He has high blood pressure and cholesterol. He works as a  for the [email or web address]. His last PSA was 3.79 on 10/20/2020 which is acceptable to watch. PVR is 171. \par \par 2021: Patient was not seen in office today. \par \par 2021: Patient presents today for a telehealth visit for which he gave permission for. The patient was anxious today and apologized if he was asking questions that were detailed but he was very concerned since his father  at age 91, had pros ca and radioactive seed implants at 76 y/o and had many complications in terms of seeds entering the urethra and bladder causing pain, difficulties, and urinary symptoms. The patient said the seeds had broken up. The patient has been on tamsulosin once daily. He finds it optimal in helping some of his urinary symptoms, however he still does have a lot of symptoms. He had a UTI during my care and since then he no longer has the same sensation when he needs to urinate. He has a sense but no urge specifically at night. He finds that his stream sprays. He is going more frequently, every 2-3 hours which is bothersome to him. He reports his stream is weaker at night since the UTI. He was distressed that I wasn’t ordering an MRI of the prostate for him, however I explained that I would write for one but he would not meet the insurance criteria so it most likely would not be authorized and that the current price was about 800$. Much of what he wanted an MRI for was not for detection of prostate cancer but he wanted a better understanding of anatomical problems. I agreed with him that an MRI would provide a better estimation of size and a better knowledge of the prostae than a regular digital rectal exam would, however I informed him that we can get valuable information as well from a transrectal US in  terms of size and configuration. I also informed him that we can get information about configuration from cystoscopy as well . The patient was agreeable that he'd rather get the transrectal US before allotting for an MRI. His current copay for a telehealth visit is about 50$ and he informed me that he was looking not to come in or have a telehealth visit due to expenses involved since his financial means are limited at this current time. He'd be interested in an in person visit in October and would do the transrectal US at that time. I also asked him about blood work. I offered to put in the computer to do ahead of time but he was content on doing it the same day he came in. I talked about procedures to improve urination if medication management failed. I said since we are having difficulties with medical management of symptoms that we could do urodynamics and cystoscopy to better understand the anatomy and physiology relative to his difficulties. I explained the procedure in detail. Review risks in addition to the benefits. I informed him of possible risk of infection and discomfort. Reviewed procedures for improving urination by decreasing bladder outlet obstruction due to BPH. Discussed steam and water techniques  We spoke about electrosurgical techniques and laser techniques for dealing with BPH resulting in bladder outlet obstruction. The patient has not been taking tadalafil ever day. He tried it only on days he wanted to have sex which he reports did help but he is not sexually active at the moment. He was paying 20$ at his local [facility], which I thought was acceptable. I advised him to check the price if he refills it as we can find him a place to charge him less if they are charging him more than 20$. He didn't want 30 pills since he is not active however I informed him it would improve his urination if taken every day. He admitted that when he was taking it sporadically it did help his urination some. I reviewed thoughts behind taking it every day. I believe his agreement to try it was sincere, placed a new prescription with refills. As noted above, I placed orders for a transrectal US to be scheduled on or around 10/6/2021. I told the patient that when he arrives on the day of, he should go first to give blood in our lab then have the US. Pt understands this. \par \par 2021: Patient presents today for prostate US. Prostate US results show normal echogenicity. Prostatic calcifications were visualized. No masses visualized. No obstruction of ejaculatory ducts. Seminal vesicles are normal size with no blood in it. +FHx of prostate cancer in father who was dx at 76y/o. Had radioactive seeds which caused pain and problems in him in that seeds broke up at as gravel. States ever since starting Tadalafil 5mg with Tamsulosin 0.4mg once daily half hour after dinner, feels he is ejaculating more. States he in the past took Tamsulosin twice daily, but was disturbed that it affected his ejaculate. States he had been celibate since the pandemic, but is interesting in returning. Since taking Tadalafil, has been ejaculating more and feels more satisfied when he masturbates. Strength of erections vary. Additionally, patient states he must urinate every 3-4 hours. Wakes 2x at night to urinate. States that since UTI he had 13 months ago, feels the urge to urinate has not been as severe. Dealing with some stress as mother is having speech problems and sees speech pathologist. \par \par 2021: Patient presents today for a follow up telephone visit for which he gave permission for. Currently on tadalafil 5 mg once daily and tamsulosin 0.4 mg once daily. Patient had lab work done on 2021. UA was good. Blood work demonstrated a PSA of 2.08. Estradiol level, prolactin, LH, and calcium were all normal. Creatinine was 0.9. Glucose was 106. Electrolytes and Alk phos were normal. Urine cytology was benign. Urine culture was no growth. Testosterone free was normal at 10.8, testosterone total was slightly low at 235, but with a normal free testosterone I would be reluctant to prescribe something to augment testosterone. Estrogens were elevated at 311 where the normal amount for males is within range of . The patient states that he has been feeling a little tired everyday around 3 pm where he feels like he could take a nap. On tamsulosin and tadalafil, his urination is improved. \par \par 2021: I attempted to call the patient at 6:56 PM. Patient was scheduled for 6:00 PM appointment. I left a voicemail stating that recent lab work shows that estrogen remains elevated and will consider starting Anastrazole to decrease estrogen levels or see an endocrinologist. He should reschedule telehealth appointment to discuss. I have provided the name of an endocrinologist Dr. Antonio Driscoll. \par \par 08/10/2022:  presents today for a telephone visit for which he gave permission for. He reports that he has been doing well but has been busy taking care of his mother who is 97. She is mostly lucid but memory is diminished. He shares the responsibility of caring for her with his brother. Pt expresses that he is discombobulated over all the phone calls he has been getting. The pt last saw me in 2021. He retired in 2022. In January he had to change his health plan. He had intention of returning this October and called my office yesterday morning. He was told he had an appt for 2022. After he hung up he got a phone call and VM requesting he get switched to a virtual appointment. He called and spoke to my  and informed her he needs to see me in person. Adeline told him she would call him back. She called back and informed him I would like to schedule a telehealth visit and then have him come in for a physical examination. He was confused by this. He then got a phone call from my office saying that we didn’t have his health plan in our system. He now has [facility]. The patient continues taking taking tamsulosin 0.4 mg once daily and tadalafil 5 mg once daily. He feels he is urinating well. At night he gets up about 2x to void. The stream is weak at night. Erections are improved since taking tadalafil 5 mg once daily. Has not been sexually active with another partner lately. He has been exercising more frequently. He walks on average 10,000 steps a day. \par \par 10/26/2022:  presents today for a follow up audio only telephone visit for which he gave permission for. Pt currently takes tadalafil 5 mg once daily, tamsulosin 0.4 mg once daily, and alfuzosin ER 10 mg once daily. The substitution of alfuzosin for one tamsulosin was to decrease the impact on his semen volume. He reports that his semen volume is improved. He reports that his urination has been improved by these medications. Some nights he wakes 2-3x to urinate and others he can go 4-5 hours without waking up. Certain nights his stream is powerful and other nights he finds it to be more of a trickle. Ever since he has been taking these medications, his urinary urgency has gone away. He denies any ill effects from any of the medications. He is now retired from being a . He retired in February. He now has a part time job. Since this part time job he has been taking the tamsulosin much later at night and not after a meal. Pt states that when he stands to urinate, it can get messy due to spray and deviating stream.

## 2022-11-02 ENCOUNTER — APPOINTMENT (OUTPATIENT)
Dept: UROLOGY | Facility: CLINIC | Age: 62
End: 2022-11-02

## 2022-12-04 DIAGNOSIS — R89.6 ABNORMAL CYTOLOGICAL FINDINGS IN SPECIMENS FROM OTHER ORGANS, SYSTEMS AND TISSUES: ICD-10-CM

## 2022-12-07 ENCOUNTER — APPOINTMENT (OUTPATIENT)
Dept: UROLOGY | Facility: CLINIC | Age: 62
End: 2022-12-07

## 2022-12-07 DIAGNOSIS — N41.0 ACUTE PROSTATITIS: ICD-10-CM

## 2022-12-07 PROCEDURE — 99214 OFFICE O/P EST MOD 30 MIN: CPT

## 2022-12-07 NOTE — ADDENDUM
[FreeTextEntry1] : This note was authored by Kalpana Herrera working as a scribe for Dr.Gary Leggett. I, Dr. Chris Leggett have reviewed the content of this note and confirm it is true and accurate. I personally performed the history and physical examination and made all the decisions 12/07/2022

## 2022-12-07 NOTE — PHYSICAL EXAM
[General Appearance - Well Developed] : well developed [General Appearance - Well Nourished] : well nourished [Normal Appearance] : normal appearance [Well Groomed] : well groomed [General Appearance - In No Acute Distress] : no acute distress [Abdomen Soft] : soft [Abdomen Tenderness] : non-tender [Costovertebral Angle Tenderness] : no ~M costovertebral angle tenderness [Skin Color & Pigmentation] : normal skin color and pigmentation [Edema] : no peripheral edema [] : no respiratory distress [Respiration, Rhythm And Depth] : normal respiratory rhythm and effort [Exaggerated Use Of Accessory Muscles For Inspiration] : no accessory muscle use [Oriented To Time, Place, And Person] : oriented to person, place, and time [Affect] : the affect was normal [Mood] : the mood was normal [Not Anxious] : not anxious [Normal Station and Gait] : the gait and station were normal for the patient's age [No Focal Deficits] : no focal deficits [FreeTextEntry1] : The knee-chest position was utilized for the MORENA. Normal seminal vesicles. Anal tone is normal. Little anal skin tag at 11 o'clock. Erythema in the naval crease and above, looks like a little bit of eczema. The prostate is V shaped, wider than high, non tender, with normal texture, discrete borders, and no nodules. It is a 15 gram transurethral resection size. No rectal mucosal lesions. No gross blood on the examining finger. Pt uncomfortable with the exam but able to cooperate.

## 2022-12-07 NOTE — ASSESSMENT
[FreeTextEntry1] : 10/21/19: Michael Canela is a 61 y/o male patient who presents today for an initial evaluation. For the past 4-5 years he has been seeing various urologists. He began taking Tamsulosin 0.4 mg BID to improve his urination. He became concerned when the amount of ejaculate decreased and as a result the Tamsulosin was decreased to one capsule per day. Urinary frequency is reported. He states that the stream is strong. Nocturia x 2 is reported. When he wakes up at night he states that the urinary stream is weaker than it is during the day. He believes that his urination is significantly improved since beginning the Tamsulosin. He states that his sexual function is decreased, erections occur less often. He is a  and drinks at least 2 quarts of water a day. His father was diagnosed with prostate cancer in his 70's. His father also had colon cancer. His mother was diagnosed with breast cancer. He denies a Hx and FHx of kidney stones. Currently smokes less than half a pack of cigarettes a day and is attempting to quit. \par \par 2020: 59 yr male with LUTS presents dysuria , freq and recent fever and chill, resolving with abx cefalexin \par \par \par 10/07/2020: 59 yr male Acute LUTs possible uti , resolved with levaquin, Doxazosin \par to repeat ua micros\par PSA afte MORENA. \par Knee chest position was used. Digital rectal exam found no suspicious rectal masses. No rectal mucosal lesions. Anal tone is normal. The prostate is non tender, with normal texture, discrete borders, and no nodules. It is a 45 gram transurethral resection size. No gross blood on examining fingers. \par PVR 348ml after urination.  after repeat urination. \par Advised the patient to routinely double void. \par Patient will continue taking Tamsulosin 0.4mg BID and will call office in 2 weeks to report status of urination.\par RTO in 3 months for follow up. \par \par 2021: The patient presents today for a follow up. Patient appears concerned and very anxious although reports he does not usually get like this.Since his infection, he doesn't feel like he used to and reports feeling overall different which has been overwhelming. He used to wake up at night feeling an urge but now doesn't. Voids about 8-9x a day. Still has urinary frequency but denies urinary urgency. He reports drinking an adequate amount of liquids. Reports his father had prostate ca. His father had radiation seeds at 78 y/o that broke up and would cause blockages, causing his father a lot of pain, burning, and infections. Patient seems worried about going through the same that his father went through. The patient discontinued Pyridium 200 mg 1 tablet 3x a day after meals for burning. He was taking tamsulosin 0.4 mg 2x daily, however he went down to 1 after having ejaculation contraction problems. Before the pandemic he reports being sexually active. His libido is still good, however he reports that his erections are not as adequate as he'd like. He has high blood pressure and cholesterol. He works as a  for the [employer]. His last PSA was 3.79 on 10/20/2020 which is acceptable to watch. PVR is 171. \par \par 2021: Patient presents today for a telehealth visit for which he gave permission for. The patient was anxious today and apologized if he was asking questions that were detailed but he was very concerned since his father  at age 91, had pros ca and radioactive seed implants at 78 y/o and had many complications in terms of seeds entering the urethra and bladder causing pain, difficulties, and urinary symptoms. The patient said the seeds had broken up. The patient has been on tamsulosin once daily. He finds it optimal in helping some of his urinary symptoms, however he still does have a lot of symptoms. He had a UTI during my care and since then he no longer has the same sensation when he needs to urinate. He has a sense but no urge specifically at night. He finds that his stream sprays. He is going more frequently, every 2-3 hours which is bothersome to him. He reports his stream is weaker at night since the UTI. He was distressed that I wasn’t ordering an MRI of the prostate for him, however I explained that I would write for one but he would not meet the insurance criteria so it most likely would not be authorized and that the current price was about 800$. Much of what he wanted an MRI for was not for detection of prostate cancer but he wanted a better understanding of anatomical problems. I agreed with him that an MRI would provide a better estimation of size and a better knowledge of the prostae than a regular digital rectal exam would, however I informed him that we can get valuable information as well from a transrectal US in  terms of size and configuration. I also informed him that we can get information about configuration from cystoscopy as well . The patient was agreeable that he'd rather get the transrectal US before allotting for an MRI. His current copay for a telehealth visit is about 50$ and he informed me that he was looking not to come in or have a telehealth visit due to expenses involved since his financial means are limited at this current time. He'd be interested in an in person visit in October and would do the transrectal US at that time. I also asked him about blood work. I offered to put in the computer to do ahead of time but he was content on doing it the same day he came in. I talked about procedures to improve urination if medication management failed. I said since we are having difficulties with medical management of symptoms that we could do urodynamics and cystoscopy to better understand the anatomy and physiology relative to his difficulties. I explained the procedure in detail. Review risks in addition to the benefits. I informed him of possible risk of infection and discomfort. Reviewed procedures for improving urination by decreasing bladder outlet obstruction due to BPH. Discussed steam and water techniques  We spoke about electrosurgical techniques and laser techniques for dealing with BPH resulting in bladder outlet obstruction. The patient has not been taking tadalafil ever day. He tried it only on days he wanted to have sex which he reports did help but he is not sexually active at the moment. He was paying 20$ at his local [email or web address], which I thought was acceptable. I advised him to check the price if he refills it as we can find him a place to charge him less if they are charging him more than 20$. He didn't want 30 pills since he is not active however I informed him it would improve his urination if taken every day. He admitted that when he was taking it sporadically it did help his urination some. I reviewed thoughts behind taking it every day. I believe his agreement to try it was sincere, placed a new prescription with refills. As noted above, I placed orders for a transrectal US to be scheduled on or around 10/6/2021. I told the patient that when he arrives on the day of, he should go first to give blood in our lab then have the US. Pt understands this.\par \par 2021: Patient presents today for prostate US. Prostate US results show normal echogenicity. Prostatic calcifications were visualized. No masses visualized. No obstruction of ejaculatory ducts. Seminal vesicles are normal size with no blood in it. +FHx of prostate cancer in father who was dx at 76y/o. Had radioactive seeds which caused pain and problems in him in that seeds broke up at as gravel. States ever since starting Tadalafil 5mg with Tamsulosin 0.4mg once daily half hour after dinner, feels he is ejaculating more. States he in the past took Tamsulosin twice daily, but was disturbed that it affected his ejaculate. States he had been celibate since the pandemic, but is interesting in returning. Since taking Tadalafil, has been ejaculating more and feels more satisfied when he masturbates. Strength of erections vary. Additionally, patient states he must urinate every 3-4 hours. Wakes 2x at night to urinate. States that since UTI he had 13 months ago, feels the urge to urinate has not been as severe but feels stream has been weaker since then. Dealing with some stress as mother is having speech problems s/p stroke and sees speech pathologist. \par \par 2021: Patient presents today for a follow up telephone visit for which he gave permission for. Currently on tadalafil 5 mg once daily and tamsulosin 0.4 mg once daily. Patient had lab work done on 2021. UA was good. Blood work demonstrated a PSA of 2.08. Estradiol level, prolactin, LH, and calcium were all normal. Creatinine was 0.9. Glucose was 106. Electrolytes and Alk phos were normal. Urine cytology was benign. Urine culture was no growth. Testosterone free was normal at 10.8, testosterone total was slightly low at 235, but with a normal free testosterone I would be reluctant to prescribe something to augment testosterone. Estrogens were elevated at 311 where the normal amount for males is within range of . The patient states that he has been feeling a little tired everyday around 3 pm where he feels like he could take a nap. On tamsulosin and tadalafil, his urination is improved. \par \par 2021: I attempted to call the patient at 6:56 PM. Patient was scheduled for 6:00 PM appointment. I left a voicemail stating that recent lab work shows that estrogen remains elevated and will consider starting Anastrazole to decrease estrogen levels or see an endocrinologist. He should reschedule telehealth appointment to discuss. I have provided the name of an endocrinologist Dr. Antonio Driscoll. \par \par 08/10/2022:  presents today for a telephone visit for which he gave permission for. He reports that he has been doing well but has been busy taking care of his mother who is 97. She is mostly lucid but memory is diminished. He shares the responsibility of caring for her with his brother. Pt expresses that he is discombobulated over all the phone calls he has been getting. The pt last saw me in 2021. He retired in 2022. In January he had to change his health plan. He had intention of returning this October and called my office yesterday morning. He was told he had an appt for 2022. After he hung up he got a phone call and VM requesting he get switched to a virtual appointment. He called and spoke to my  and informed her he needs to see me in person. Adeline told him she would call him back. She called back and informed him I would like to schedule a telehealth visit and then have him come in for a physical examination. He was confused by this. He then got a phone call from my office saying that we didn’t have his health plan in our system. He now has [employer]. The patient continues taking taking tamsulosin 0.4 mg once daily and tadalafil 5 mg once daily. He feels he is urinating well. At night he gets up about 2x to void. The stream is weak at night. Erections are improved since taking tadalafil 5 mg once daily. Has not been sexually active with another partner lately. He has been exercising more frequently. He walks on average 10,000 steps a day. \par \par I explained to the patient that I will be moving most of my work to telehealth visits. I explained the reasoning and rationale for this transition and why I prefer a telehealth visit prior to him coming in for physical examination and getting lab work prior. Pt understood. The patient would like to keep his 2022 visit. I was agreeable with him keeping his appointment so long as the week prior we have a telehealth visit. The pt understood and was agreeable. He understood the plan. \par On 2021, the patient's prolactin was elevated at 20.7. I had requested that he see an endocrinologist, however he was unable to. He informs me that if his laboratory work is done through [facility], it will not cost him much. Therefore, i am sending the patient for laboratory work at [facility]. Will recheck prolactin level.  \par Pt will continue taking tamsulosin 0.4 mg once daily after dinner. Will continue tadalafil 5 mg once daily. I prescribed the patient Alfuzosin ER 10 mg, once daily after breakfast. I informed the patient of lightheadedness as a side effect. It was my original recommendation to increase tamsulosin to BID, however patient states he was disturbed by the decreased semen volume when he used to take tamsulosin BID.  \par I informed the patient that I will check with billing regarding his health insurance and if I accept it. I assured him that I or someone I trust will be calling him back about it. If I accept his insurance there will be no problems but if I don’t he will get a list of approved providers and I will recommend him to whoever I think is best and send his records. If we accept his insurance, I will recommend the patient to a PCP, since he states he does not have one.  \par \par 10/26/2022:  presents today for a follow up audio only telephone visit for which he gave permission for. Pt currently takes tadalafil 5 mg once daily, tamsulosin 0.4 mg once daily, and alfuzosin ER 10 mg once daily. The substitution of alfuzosin for one tamsulosin was to decrease the impact on his semen volume. He reports that his semen volume is improved. He reports that his urination has been improved by these medications. Some nights he wakes 2-3x to urinate and others he can go 4-5 hours without waking up. Certain nights his stream is powerful and other nights he finds it to be more of a trickle. Ever since he has been taking these medications, his urinary urgency has gone away. He denies any ill effects from any of the medications. He is now retired from being a . He retired in February. He now has a part time job. Since this part time job he has been taking the tamsulosin much later at night and not after a meal. Pt states that when he stands to urinate, it can get messy due to spray and deviating stream. \par \par Reviewed and discussed laboratory work of 8/15/2022 in detail with the patient. Creatinine was 0.87. PSA  was 1.7 with a percent free of 0.4. \par I advised the patient to take tamsulosin 0.4 mg once daily about 15-20 minutes after a meal for better absorption. I advised him to bring a snack with him to his part time job so he can take it. Pt was agreeable. Will continue alfuzosin ER 10 mg once daily and tadalafil 5 mg once daily. Prescriptions were renewed. \par Advised the patient to sit when he urinates to avoid making a mess from a spray stream. I will examine urethral meatus when he comes in person. Will do a bladder scan as well. \par Pt inquired about medications that shrink the prostate. We discussed the possibility of finasteride and dutasteride as well as their ill effects. \par Pt is scheduled to see me for an in person office visit 2022 for MORENA, PE of urethral meatus, and bladder scan. \par \par 2022:  presents today for a follow up. Has been taking tamsulosin 0.4 mg once daily, alfuzosin ER 10 mg once daily and tadalafil 5 mg once daily. Has urinary frequency in the morning when he is driving but this is usually right after his morning cup of coffee. As the day goes on, his urination is fairly good. Voids about every 3-4 hours. Nocturia x1-2, however stream can be a trickle during the night. Pt is able to get erections but has not been very sexually active since the pandemic. 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 Retired from his career a few months ago. \par \par The knee-chest position was utilized for the MORENA. Normal seminal vesicles. Anal tone is normal. Little anal skin tag at 11 o'clock. Erythema in the naval crease and above, looks like a little bit of eczema. The prostate is V shaped, wider than high, non tender, with normal texture, discrete borders, and no nodules. It is a 15 gram transurethral resection size. No rectal mucosal lesions. No gross blood on the examining finger. Pt uncomfortable with the exam but able to cooperate. \par \par Advised patient to see a dermatologist for erythema in the naval crease. Advised to use hydrocortisone cream until then. \par \par I increased the dosage of Alfuzosin ER 10 mg from once daily to twice daily. Will continue tamsulosin 0.4 mg once daily after a meal and tadalafil 5 mg once daily. \par \par The patient produced a urine sample at [facility] lab which will be sent for urinalysis, urine cytology, and urine culture. \par \par Patient will have a telehealth visit in 2-3 weeks to discuss the results of his lab work and discuss increased dose of alfuzosin. Will RTO in one year for annual wellness exam. \par \par Preparation, in person office visit, and coordination of care: 30 minutes.  no chills/no numbness/no vomiting/no blurred vision/no weakness/no change in level of consciousness/no fever/no nausea/no syncope/no loss of consciousness

## 2022-12-07 NOTE — HISTORY OF PRESENT ILLNESS
[FreeTextEntry1] : 10/21/19: Michael Canela is a 61 y/o male patient who presents today for an initial evaluation. For the past 4-5 years he has been seeing various urologists. He began taking Tamsulosin 0.4 mg BID to improve his urination. He became concerned when the amount of ejaculate decreased and as a result the Tamsulosin was decreased to one capsule per day. Urinary frequency is reported. He states that the stream is strong. Nocturia x 2 is reported. When he wakes up at night he states that the urinary stream is weaker than it is during the day. He believes that his urination is significantly improved since beginning the Tamsulosin. He states that his sexual function is decreased, erections occur less often. He is a  and drinks at least 2 quarts of water a day. His father was diagnosed with prostate cancer in his 70's. His father also had colon cancer. His mother was diagnosed with breast cancer. He denies a Hx and FHx of kidney stones. Currently smokes less than half a pack of cigarettes a day and is attempting to quit. \par \par 2020: 59 yr male history of LUTS for 6 yrs going to urology. Present buring , freq, fever , pain at tip of penis, supra pubic pressure started one week. Fever resolved with abx cefalexin from urgent care. Freq and buring is resolving. Freq down to 2 hrly from q 45 min. Anxious .  . PSA 1.58 as at 10/19 \par \par 10/7/2020: 59 yr male with acute  LUTs symptoms concerning for UTI ,now resolved after Levofloxacin 750mg take for 7 days and feels well today.  Doxazosin. Takes Tamsulosin 0.4mg BID. \par Said pressure and burning and pain tip of penis resolved.  Wakes 2X nocte to pee.  Freq during the day .  Feels like do not empty bladder and feels urgency in the morning hours, but stream is good and no leakage. Feels increased urgency when driving. Pt concerned about low testosterone. Erections are adequate. \par UA/Cx  show now bacterial growth.  Unremarkable \par PSA  1.58 oct 2019 \par +FHx of bladder stone in father. \par \par 2021: The patient presents today for a follow up. Patient appears concerned and very anxious although reports he does not usually get like this.Since his infection, he doesn't feel like he used to and reports feeling overall different which has been overwhelming. He used to wake up at night feeling an urge but now doesn't. Voids about 8-9x a day. Still has urinary frequency but denies urinary urgency. He reports drinking an adequate amount of liquids. Reports his father had prostate ca. His father had radiation seeds at 76 y/o that broke up and would cause blockages, causing his father a lot of pain, burning, and infections. Patient seems worried about going through the same that his father went through. The patient discontinued Pyridium 200 mg 1 tablet 3x a day after meals for burning. He was taking tamsulosin 0.4 mg 2x daily, however he went down to 1 after having ejaculation contraction problems. Before the pandemic he reports being sexually active. His libido is still good, however he reports that his erections are not as adequate as he'd like. He has high blood pressure and cholesterol. He works as a  for the [employer]. His last PSA was 3.79 on 10/20/2020 which is acceptable to watch. PVR is 171. \par \par 2021: Patient was not seen in office today. \par \par 2021: Patient presents today for a telehealth visit for which he gave permission for. The patient was anxious today and apologized if he was asking questions that were detailed but he was very concerned since his father  at age 91, had pros ca and radioactive seed implants at 76 y/o and had many complications in terms of seeds entering the urethra and bladder causing pain, difficulties, and urinary symptoms. The patient said the seeds had broken up. The patient has been on tamsulosin once daily. He finds it optimal in helping some of his urinary symptoms, however he still does have a lot of symptoms. He had a UTI during my care and since then he no longer has the same sensation when he needs to urinate. He has a sense but no urge specifically at night. He finds that his stream sprays. He is going more frequently, every 2-3 hours which is bothersome to him. He reports his stream is weaker at night since the UTI. He was distressed that I wasn’t ordering an MRI of the prostate for him, however I explained that I would write for one but he would not meet the insurance criteria so it most likely would not be authorized and that the current price was about 800$. Much of what he wanted an MRI for was not for detection of prostate cancer but he wanted a better understanding of anatomical problems. I agreed with him that an MRI would provide a better estimation of size and a better knowledge of the prostae than a regular digital rectal exam would, however I informed him that we can get valuable information as well from a transrectal US in  terms of size and configuration. I also informed him that we can get information about configuration from cystoscopy as well . The patient was agreeable that he'd rather get the transrectal US before allotting for an MRI. His current copay for a telehealth visit is about 50$ and he informed me that he was looking not to come in or have a telehealth visit due to expenses involved since his financial means are limited at this current time. He'd be interested in an in person visit in October and would do the transrectal US at that time. I also asked him about blood work. I offered to put in the computer to do ahead of time but he was content on doing it the same day he came in. I talked about procedures to improve urination if medication management failed. I said since we are having difficulties with medical management of symptoms that we could do urodynamics and cystoscopy to better understand the anatomy and physiology relative to his difficulties. I explained the procedure in detail. Review risks in addition to the benefits. I informed him of possible risk of infection and discomfort. Reviewed procedures for improving urination by decreasing bladder outlet obstruction due to BPH. Discussed steam and water techniques  We spoke about electrosurgical techniques and laser techniques for dealing with BPH resulting in bladder outlet obstruction. The patient has not been taking tadalafil ever day. He tried it only on days he wanted to have sex which he reports did help but he is not sexually active at the moment. He was paying 20$ at his local [facility], which I thought was acceptable. I advised him to check the price if he refills it as we can find him a place to charge him less if they are charging him more than 20$. He didn't want 30 pills since he is not active however I informed him it would improve his urination if taken every day. He admitted that when he was taking it sporadically it did help his urination some. I reviewed thoughts behind taking it every day. I believe his agreement to try it was sincere, placed a new prescription with refills. As noted above, I placed orders for a transrectal US to be scheduled on or around 10/6/2021. I told the patient that when he arrives on the day of, he should go first to give blood in our lab then have the US. Pt understands this. \par \par 2021: Patient presents today for prostate US. Prostate US results show normal echogenicity. Prostatic calcifications were visualized. No masses visualized. No obstruction of ejaculatory ducts. Seminal vesicles are normal size with no blood in it. +FHx of prostate cancer in father who was dx at 78y/o. Had radioactive seeds which caused pain and problems in him in that seeds broke up at as gravel. States ever since starting Tadalafil 5mg with Tamsulosin 0.4mg once daily half hour after dinner, feels he is ejaculating more. States he in the past took Tamsulosin twice daily, but was disturbed that it affected his ejaculate. States he had been celibate since the pandemic, but is interesting in returning. Since taking Tadalafil, has been ejaculating more and feels more satisfied when he masturbates. Strength of erections vary. Additionally, patient states he must urinate every 3-4 hours. Wakes 2x at night to urinate. States that since UTI he had 13 months ago, feels the urge to urinate has not been as severe. Dealing with some stress as mother is having speech problems and sees speech pathologist. \par \par 2021: Patient presents today for a follow up telephone visit for which he gave permission for. Currently on tadalafil 5 mg once daily and tamsulosin 0.4 mg once daily. Patient had lab work done on 2021. UA was good. Blood work demonstrated a PSA of 2.08. Estradiol level, prolactin, LH, and calcium were all normal. Creatinine was 0.9. Glucose was 106. Electrolytes and Alk phos were normal. Urine cytology was benign. Urine culture was no growth. Testosterone free was normal at 10.8, testosterone total was slightly low at 235, but with a normal free testosterone I would be reluctant to prescribe something to augment testosterone. Estrogens were elevated at 311 where the normal amount for males is within range of . The patient states that he has been feeling a little tired everyday around 3 pm where he feels like he could take a nap. On tamsulosin and tadalafil, his urination is improved. \par \par 2021: I attempted to call the patient at 6:56 PM. Patient was scheduled for 6:00 PM appointment. I left a voicemail stating that recent lab work shows that estrogen remains elevated and will consider starting Anastrazole to decrease estrogen levels or see an endocrinologist. He should reschedule telehealth appointment to discuss. I have provided the name of an endocrinologist Dr. Antonio Driscoll. \par \par 08/10/2022:  presents today for a telephone visit for which he gave permission for. He reports that he has been doing well but has been busy taking care of his mother who is 97. She is mostly lucid but memory is diminished. He shares the responsibility of caring for her with his brother. Pt expresses that he is discombobulated over all the phone calls he has been getting. The pt last saw me in 2021. He retired in 2022. In January he had to change his health plan. He had intention of returning this October and called my office yesterday morning. He was told he had an appt for 2022. After he hung up he got a phone call and VM requesting he get switched to a virtual appointment. He called and spoke to my  and informed her he needs to see me in person. Adeline told him she would call him back. She called back and informed him I would like to schedule a telehealth visit and then have him come in for a physical examination. He was confused by this. He then got a phone call from my office saying that we didn’t have his health plan in our system. He now has [employer]. The patient continues taking taking tamsulosin 0.4 mg once daily and tadalafil 5 mg once daily. He feels he is urinating well. At night he gets up about 2x to void. The stream is weak at night. Erections are improved since taking tadalafil 5 mg once daily. Has not been sexually active with another partner lately. He has been exercising more frequently. He walks on average 10,000 steps a day. \par \par 10/26/2022:  presents today for a follow up audio only telephone visit for which he gave permission for. Pt currently takes tadalafil 5 mg once daily, tamsulosin 0.4 mg once daily, and alfuzosin ER 10 mg once daily. The substitution of alfuzosin for one tamsulosin was to decrease the impact on his semen volume. He reports that his semen volume is improved. He reports that his urination has been improved by these medications. Some nights he wakes 2-3x to urinate and others he can go 4-5 hours without waking up. Certain nights his stream is powerful and other nights he finds it to be more of a trickle. Ever since he has been taking these medications, his urinary urgency has gone away. He denies any ill effects from any of the medications. He is now retired from being a . He retired in February. He now has a part time job. Since this part time job he has been taking the tamsulosin much later at night and not after a meal. Pt states that when he stands to urinate, it can get messy due to spray and deviating stream. \par \par 2022:  presents today for a follow up. Has been taking tamsulosin 0.4 mg once daily, alfuzosin ER 10 mg once daily and tadalafil 5 mg once daily. Has urinary frequency in the morning when he is driving but this is usually right after his morning cup of coffee. As the day goes on, his urination is fairly good. Voids about every 3-4 hours. Nocturia x1-2, however stream can be a trickle during the night. Pt is able to get erections but has not been very sexually active since the pandemic. 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 Retired from his career a few months ago.

## 2023-01-04 ENCOUNTER — APPOINTMENT (OUTPATIENT)
Dept: UROLOGY | Facility: CLINIC | Age: 63
End: 2023-01-04
Payer: COMMERCIAL

## 2023-01-04 ENCOUNTER — NON-APPOINTMENT (OUTPATIENT)
Age: 63
End: 2023-01-04

## 2023-01-04 DIAGNOSIS — R33.9 RETENTION OF URINE, UNSPECIFIED: ICD-10-CM

## 2023-01-04 PROCEDURE — 99214 OFFICE O/P EST MOD 30 MIN: CPT | Mod: 95

## 2023-01-04 NOTE — HISTORY OF PRESENT ILLNESS
[Home] : at home, [unfilled] , at the time of the visit. [Verbal consent obtained from patient] : the patient, [unfilled] [Medical Office: ([facility])___] : at the medical office located in  [FreeTextEntry1] : 10/21/19: Michael Canela is a 63 y/o male patient who presents today for an initial evaluation. For the past 4-5 years he has been seeing various urologists. He began taking Tamsulosin 0.4 mg BID to improve his urination. He became concerned when the amount of ejaculate decreased and as a result the Tamsulosin was decreased to one capsule per day. Urinary frequency is reported. He states that the stream is strong. Nocturia x 2 is reported. When he wakes up at night he states that the urinary stream is weaker than it is during the day. He believes that his urination is significantly improved since beginning the Tamsulosin. He states that his sexual function is decreased, erections occur less often. He is a  and drinks at least 2 quarts of water a day. His father was diagnosed with prostate cancer in his 70's. His father also had colon cancer. His mother was diagnosed with breast cancer. He denies a Hx and FHx of kidney stones. Currently smokes less than half a pack of cigarettes a day and is attempting to quit. \par \par 2020: 59 yr male history of LUTS for 6 yrs going to urology. Present buring , freq, fever , pain at tip of penis, supra pubic pressure started one week. Fever resolved with abx cefalexin from urgent care. Freq and buring is resolving. Freq down to 2 hrly from q 45 min. Anxious .  . PSA 1.58 as at 10/19 \par \par 10/7/2020: 59 yr male with acute  LUTs symptoms concerning for UTI ,now resolved after Levofloxacin 750mg take for 7 days and feels well today.  Doxazosin. Takes Tamsulosin 0.4mg BID. \par Said pressure and burning and pain tip of penis resolved.  Wakes 2X nocte to pee.  Freq during the day .  Feels like do not empty bladder and feels urgency in the morning hours, but stream is good and no leakage. Feels increased urgency when driving. Pt concerned about low testosterone. Erections are adequate. \par UA/Cx  show now bacterial growth.  Unremarkable \par PSA  1.58 oct 2019 \par +FHx of bladder stone in father. \par \par 2021: The patient presents today for a follow up. Patient appears concerned and very anxious although reports he does not usually get like this.Since his infection, he doesn't feel like he used to and reports feeling overall different which has been overwhelming. He used to wake up at night feeling an urge but now doesn't. Voids about 8-9x a day. Still has urinary frequency but denies urinary urgency. He reports drinking an adequate amount of liquids. Reports his father had prostate ca. His father had radiation seeds at 76 y/o that broke up and would cause blockages, causing his father a lot of pain, burning, and infections. Patient seems worried about going through the same that his father went through. The patient discontinued Pyridium 200 mg 1 tablet 3x a day after meals for burning. He was taking tamsulosin 0.4 mg 2x daily, however he went down to 1 after having ejaculation contraction problems. Before the pandemic he reports being sexually active. His libido is still good, however he reports that his erections are not as adequate as he'd like. He has high blood pressure and cholesterol. He works as a  for the [employer]. His last PSA was 3.79 on 10/20/2020 which is acceptable to watch. PVR is 171. \par \par 2021: Patient was not seen in office today. \par \par 2021: Patient presents today for a telehealth visit for which he gave permission for. The patient was anxious today and apologized if he was asking questions that were detailed but he was very concerned since his father  at age 91, had pros ca and radioactive seed implants at 76 y/o and had many complications in terms of seeds entering the urethra and bladder causing pain, difficulties, and urinary symptoms. The patient said the seeds had broken up. The patient has been on tamsulosin once daily. He finds it optimal in helping some of his urinary symptoms, however he still does have a lot of symptoms. He had a UTI during my care and since then he no longer has the same sensation when he needs to urinate. He has a sense but no urge specifically at night. He finds that his stream sprays. He is going more frequently, every 2-3 hours which is bothersome to him. He reports his stream is weaker at night since the UTI. He was distressed that I wasn’t ordering an MRI of the prostate for him, however I explained that I would write for one but he would not meet the insurance criteria so it most likely would not be authorized and that the current price was about 800$. Much of what he wanted an MRI for was not for detection of prostate cancer but he wanted a better understanding of anatomical problems. I agreed with him that an MRI would provide a better estimation of size and a better knowledge of the prostae than a regular digital rectal exam would, however I informed him that we can get valuable information as well from a transrectal US in  terms of size and configuration. I also informed him that we can get information about configuration from cystoscopy as well . The patient was agreeable that he'd rather get the transrectal US before allotting for an MRI. His current copay for a telehealth visit is about 50$ and he informed me that he was looking not to come in or have a telehealth visit due to expenses involved since his financial means are limited at this current time. He'd be interested in an in person visit in October and would do the transrectal US at that time. I also asked him about blood work. I offered to put in the computer to do ahead of time but he was content on doing it the same day he came in. I talked about procedures to improve urination if medication management failed. I said since we are having difficulties with medical management of symptoms that we could do urodynamics and cystoscopy to better understand the anatomy and physiology relative to his difficulties. I explained the procedure in detail. Review risks in addition to the benefits. I informed him of possible risk of infection and discomfort. Reviewed procedures for improving urination by decreasing bladder outlet obstruction due to BPH. Discussed steam and water techniques  We spoke about electrosurgical techniques and laser techniques for dealing with BPH resulting in bladder outlet obstruction. The patient has not been taking tadalafil ever day. He tried it only on days he wanted to have sex which he reports did help but he is not sexually active at the moment. He was paying 20$ at his local [facility], which I thought was acceptable. I advised him to check the price if he refills it as we can find him a place to charge him less if they are charging him more than 20$. He didn't want 30 pills since he is not active however I informed him it would improve his urination if taken every day. He admitted that when he was taking it sporadically it did help his urination some. I reviewed thoughts behind taking it every day. I believe his agreement to try it was sincere, placed a new prescription with refills. As noted above, I placed orders for a transrectal US to be scheduled on or around 10/6/2021. I told the patient that when he arrives on the day of, he should go first to give blood in our lab then have the US. Pt understands this. \par \par 2021: Patient presents today for prostate US. Prostate US results show normal echogenicity. Prostatic calcifications were visualized. No masses visualized. No obstruction of ejaculatory ducts. Seminal vesicles are normal size with no blood in it. +FHx of prostate cancer in father who was dx at 76y/o. Had radioactive seeds which caused pain and problems in him in that seeds broke up at as gravel. States ever since starting Tadalafil 5mg with Tamsulosin 0.4mg once daily half hour after dinner, feels he is ejaculating more. States he in the past took Tamsulosin twice daily, but was disturbed that it affected his ejaculate. States he had been celibate since the pandemic, but is interesting in returning. Since taking Tadalafil, has been ejaculating more and feels more satisfied when he masturbates. Strength of erections vary. Additionally, patient states he must urinate every 3-4 hours. Wakes 2x at night to urinate. States that since UTI he had 13 months ago, feels the urge to urinate has not been as severe. Dealing with some stress as mother is having speech problems and sees speech pathologist. \par \par 2021: Patient presents today for a follow up telephone visit for which he gave permission for. Currently on tadalafil 5 mg once daily and tamsulosin 0.4 mg once daily. Patient had lab work done on 2021. UA was good. Blood work demonstrated a PSA of 2.08. Estradiol level, prolactin, LH, and calcium were all normal. Creatinine was 0.9. Glucose was 106. Electrolytes and Alk phos were normal. Urine cytology was benign. Urine culture was no growth. Testosterone free was normal at 10.8, testosterone total was slightly low at 235, but with a normal free testosterone I would be reluctant to prescribe something to augment testosterone. Estrogens were elevated at 311 where the normal amount for males is within range of . The patient states that he has been feeling a little tired everyday around 3 pm where he feels like he could take a nap. On tamsulosin and tadalafil, his urination is improved. \par \par 2021: I attempted to call the patient at 6:56 PM. Patient was scheduled for 6:00 PM appointment. I left a voicemail stating that recent lab work shows that estrogen remains elevated and will consider starting Anastrazole to decrease estrogen levels or see an endocrinologist. He should reschedule telehealth appointment to discuss. I have provided the name of an endocrinologist Dr. Antonio Driscoll. \par \par 08/10/2022:  presents today for a telephone visit for which he gave permission for. He reports that he has been doing well but has been busy taking care of his mother who is 97. She is mostly lucid but memory is diminished. He shares the responsibility of caring for her with his brother. Pt expresses that he is discombobulated over all the phone calls he has been getting. The pt last saw me in 2021. He retired in 2022. In January he had to change his health plan. He had intention of returning this October and called my office yesterday morning. He was told he had an appt for 2022. After he hung up he got a phone call and VM requesting he get switched to a virtual appointment. He called and spoke to my  and informed her he needs to see me in person. Adeline told him she would call him back. She called back and informed him I would like to schedule a telehealth visit and then have him come in for a physical examination. He was confused by this. He then got a phone call from my office saying that we didn’t have his health plan in our system. He now has [employer]. The patient continues taking taking tamsulosin 0.4 mg once daily and tadalafil 5 mg once daily. He feels he is urinating well. At night he gets up about 2x to void. The stream is weak at night. Erections are improved since taking tadalafil 5 mg once daily. Has not been sexually active with another partner lately. He has been exercising more frequently. He walks on average 10,000 steps a day. \par \par 10/26/2022:  presents today for a follow up audio only telephone visit for which he gave permission for. Pt currently takes tadalafil 5 mg once daily, tamsulosin 0.4 mg once daily, and alfuzosin ER 10 mg once daily. The substitution of alfuzosin for one tamsulosin was to decrease the impact on his semen volume. He reports that his semen volume is improved. He reports that his urination has been improved by these medications. Some nights he wakes 2-3x to urinate and others he can go 4-5 hours without waking up. Certain nights his stream is powerful and other nights he finds it to be more of a trickle. Ever since he has been taking these medications, his urinary urgency has gone away. He denies any ill effects from any of the medications. He is now retired from being a . He retired in February. He now has a part time job. Since this part time job he has been taking the tamsulosin much later at night and not after a meal. Pt states that when he stands to urinate, it can get messy due to spray and deviating stream. \par \par 2022:  presents today for a follow up. Has been taking tamsulosin 0.4 mg once daily, alfuzosin ER 10 mg once daily and tadalafil 5 mg once daily. Has urinary frequency in the morning when he is driving but this is usually right after his morning cup of coffee. As the day goes on, his urination is fairly good. Voids about every 3-4 hours. Nocturia x1-2, however stream can be a trickle during the night. Pt is able to get erections but has not been very sexually active since the pandemic. 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 Retired from his career a few months ago. \par \par 2023  presents today for a follow up audio only telephone visit for which he gave permission for. \par Pt currently takes tadalafil 5 mg once daily, tamsulosin 0.4 mg once daily bedtime, and alfuzosin ER 10 mg BID. He has not noticed an improvement since taking alfuzosin BID and would prefer to take it once daily. Patient denies lightheadedness, nausea. Pt has results from past years to review. 2021 he reports elevated Estrogen at 311 done at [facility] and In 2022 it is 168. Pt also reports weight loss during this time period. Creatine was 0.8, FSH and LH were normal, Testosterone was 340 and PSA was 1.7. He reports fluctuation of PSA from 2.29 - 1.7. Patient urinary concerns include, after laying flat, he feels urinary urgency which increases when close to destination. Pt reports sleeping 6-7 hours with nocturia x2. He reports that he recently lost his mother at the age of 97 due to fall and breaking of hip. PT also reports feeling stressed since this occurred. He feels he gets more tired during the day, taking a nap every afternoon. He has cut down on cups of coffee down to two cups daily. Patient sexual history include masturbation. He reports decreased semen volume when ejaculating which is normal as he takes tamsulosin. With tadalafil he proclaims increased arousal, sometimes easily but without it the pt has not tested the feeling.  \par

## 2023-01-04 NOTE — ADDENDUM
[FreeTextEntry1] : This note was authored by Elida Baeza working as a scribe for Dr.Gary Leggett. I, Dr. Chris Leggett have reviewed the content of this note and confirm it is true and accurate. I personally performed the history and physical examination and made all the decisions 01/04/2023\par

## 2023-01-04 NOTE — REVIEW OF SYSTEMS
[Feeling Poorly] : not feeling poorly [Feeling Tired] : not feeling tired [Nocturia] : no nocturia [Difficulty Walking] : no difficulty walking [Anxiety] : no anxiety [Erectile Dysfunction] : no erectile dysfunction

## 2023-01-04 NOTE — PHYSICAL EXAM
[General Appearance - Well Developed] : well developed [General Appearance - Well Nourished] : well nourished [Normal Appearance] : normal appearance [Well Groomed] : well groomed [General Appearance - In No Acute Distress] : no acute distress [] : no respiratory distress [Respiration, Rhythm And Depth] : normal respiratory rhythm and effort [Exaggerated Use Of Accessory Muscles For Inspiration] : no accessory muscle use [Oriented To Time, Place, And Person] : oriented to person, place, and time [Affect] : the affect was normal [Mood] : the mood was normal [Not Anxious] : not anxious [Skin Color & Pigmentation] : normal skin color and pigmentation

## 2023-01-04 NOTE — ASSESSMENT
[FreeTextEntry1] : 10/21/19: Michael Canela is a 61 y/o male patient who presents today for an initial evaluation. For the past 4-5 years he has been seeing various urologists. He began taking Tamsulosin 0.4 mg BID to improve his urination. He became concerned when the amount of ejaculate decreased and as a result the Tamsulosin was decreased to one capsule per day. Urinary frequency is reported. He states that the stream is strong. Nocturia x 2 is reported. When he wakes up at night he states that the urinary stream is weaker than it is during the day. He believes that his urination is significantly improved since beginning the Tamsulosin. He states that his sexual function is decreased, erections occur less often. He is a  and drinks at least 2 quarts of water a day. His father was diagnosed with prostate cancer in his 70's. His father also had colon cancer. His mother was diagnosed with breast cancer. He denies a Hx and FHx of kidney stones. Currently smokes less than half a pack of cigarettes a day and is attempting to quit. \par \par 2020: 59 yr male with LUTS presents dysuria , freq and recent fever and chill, resolving with abx cefalexin \par \par \par 10/07/2020: 59 yr male Acute LUTs possible uti , resolved with levaquin, Doxazosin \par to repeat ua micros\par PSA afte MORENA. \par Knee chest position was used. Digital rectal exam found no suspicious rectal masses. No rectal mucosal lesions. Anal tone is normal. The prostate is non tender, with normal texture, discrete borders, and no nodules. It is a 45 gram transurethral resection size. No gross blood on examining fingers. \par PVR 348ml after urination.  after repeat urination. \par Advised the patient to routinely double void. \par Patient will continue taking Tamsulosin 0.4mg BID and will call office in 2 weeks to report status of urination.\par RTO in 3 months for follow up. \par \par 2021: The patient presents today for a follow up. Patient appears concerned and very anxious although reports he does not usually get like this.Since his infection, he doesn't feel like he used to and reports feeling overall different which has been overwhelming. He used to wake up at night feeling an urge but now doesn't. Voids about 8-9x a day. Still has urinary frequency but denies urinary urgency. He reports drinking an adequate amount of liquids. Reports his father had prostate ca. His father had radiation seeds at 78 y/o that broke up and would cause blockages, causing his father a lot of pain, burning, and infections. Patient seems worried about going through the same that his father went through. The patient discontinued Pyridium 200 mg 1 tablet 3x a day after meals for burning. He was taking tamsulosin 0.4 mg 2x daily, however he went down to 1 after having ejaculation contraction problems. Before the pandemic he reports being sexually active. His libido is still good, however he reports that his erections are not as adequate as he'd like. He has high blood pressure and cholesterol. He works as a  for the [employer]. His last PSA was 3.79 on 10/20/2020 which is acceptable to watch. PVR is 171. \par \par 2021: Patient presents today for a telehealth visit for which he gave permission for. The patient was anxious today and apologized if he was asking questions that were detailed but he was very concerned since his father  at age 91, had pros ca and radioactive seed implants at 78 y/o and had many complications in terms of seeds entering the urethra and bladder causing pain, difficulties, and urinary symptoms. The patient said the seeds had broken up. The patient has been on tamsulosin once daily. He finds it optimal in helping some of his urinary symptoms, however he still does have a lot of symptoms. He had a UTI during my care and since then he no longer has the same sensation when he needs to urinate. He has a sense but no urge specifically at night. He finds that his stream sprays. He is going more frequently, every 2-3 hours which is bothersome to him. He reports his stream is weaker at night since the UTI. He was distressed that I wasn’t ordering an MRI of the prostate for him, however I explained that I would write for one but he would not meet the insurance criteria so it most likely would not be authorized and that the current price was about 800$. Much of what he wanted an MRI for was not for detection of prostate cancer but he wanted a better understanding of anatomical problems. I agreed with him that an MRI would provide a better estimation of size and a better knowledge of the prostae than a regular digital rectal exam would, however I informed him that we can get valuable information as well from a transrectal US in  terms of size and configuration. I also informed him that we can get information about configuration from cystoscopy as well . The patient was agreeable that he'd rather get the transrectal US before allotting for an MRI. His current copay for a telehealth visit is about 50$ and he informed me that he was looking not to come in or have a telehealth visit due to expenses involved since his financial means are limited at this current time. He'd be interested in an in person visit in October and would do the transrectal US at that time. I also asked him about blood work. I offered to put in the computer to do ahead of time but he was content on doing it the same day he came in. I talked about procedures to improve urination if medication management failed. I said since we are having difficulties with medical management of symptoms that we could do urodynamics and cystoscopy to better understand the anatomy and physiology relative to his difficulties. I explained the procedure in detail. Review risks in addition to the benefits. I informed him of possible risk of infection and discomfort. Reviewed procedures for improving urination by decreasing bladder outlet obstruction due to BPH. Discussed steam and water techniques  We spoke about electrosurgical techniques and laser techniques for dealing with BPH resulting in bladder outlet obstruction. The patient has not been taking tadalafil ever day. He tried it only on days he wanted to have sex which he reports did help but he is not sexually active at the moment. He was paying 20$ at his local [facility], which I thought was acceptable. I advised him to check the price if he refills it as we can find him a place to charge him less if they are charging him more than 20$. He didn't want 30 pills since he is not active however I informed him it would improve his urination if taken every day. He admitted that when he was taking it sporadically it did help his urination some. I reviewed thoughts behind taking it every day. I believe his agreement to try it was sincere, placed a new prescription with refills. As noted above, I placed orders for a transrectal US to be scheduled on or around 10/6/2021. I told the patient that when he arrives on the day of, he should go first to give blood in our lab then have the US. Pt understands this.\par \par 2021: Patient presents today for prostate US. Prostate US results show normal echogenicity. Prostatic calcifications were visualized. No masses visualized. No obstruction of ejaculatory ducts. Seminal vesicles are normal size with no blood in it. +FHx of prostate cancer in father who was dx at 76y/o. Had radioactive seeds which caused pain and problems in him in that seeds broke up at as gravel. States ever since starting Tadalafil 5mg with Tamsulosin 0.4mg once daily half hour after dinner, feels he is ejaculating more. States he in the past took Tamsulosin twice daily, but was disturbed that it affected his ejaculate. States he had been celibate since the pandemic, but is interesting in returning. Since taking Tadalafil, has been ejaculating more and feels more satisfied when he masturbates. Strength of erections vary. Additionally, patient states he must urinate every 3-4 hours. Wakes 2x at night to urinate. States that since UTI he had 13 months ago, feels the urge to urinate has not been as severe but feels stream has been weaker since then. Dealing with some stress as mother is having speech problems s/p stroke and sees speech pathologist. \par \par 2021: Patient presents today for a follow up telephone visit for which he gave permission for. Currently on tadalafil 5 mg once daily and tamsulosin 0.4 mg once daily. Patient had lab work done on 2021. UA was good. Blood work demonstrated a PSA of 2.08. Estradiol level, prolactin, LH, and calcium were all normal. Creatinine was 0.9. Glucose was 106. Electrolytes and Alk phos were normal. Urine cytology was benign. Urine culture was no growth. Testosterone free was normal at 10.8, testosterone total was slightly low at 235, but with a normal free testosterone I would be reluctant to prescribe something to augment testosterone. Estrogens were elevated at 311 where the normal amount for males is within range of . The patient states that he has been feeling a little tired everyday around 3 pm where he feels like he could take a nap. On tamsulosin and tadalafil, his urination is improved. \par \par 2021: I attempted to call the patient at 6:56 PM. Patient was scheduled for 6:00 PM appointment. I left a voicemail stating that recent lab work shows that estrogen remains elevated and will consider starting Anastrazole to decrease estrogen levels or see an endocrinologist. He should reschedule telehealth appointment to discuss. I have provided the name of an endocrinologist Dr. Antonio Driscoll. \par \par 08/10/2022:  presents today for a telephone visit for which he gave permission for. He reports that he has been doing well but has been busy taking care of his mother who is 97. She is mostly lucid but memory is diminished. He shares the responsibility of caring for her with his brother. Pt expresses that he is discombobulated over all the phone calls he has been getting. The pt last saw me in 2021. He retired in 2022. In January he had to change his health plan. He had intention of returning this October and called my office yesterday morning. He was told he had an appt for 2022. After he hung up he got a phone call and VM requesting he get switched to a virtual appointment. He called and spoke to my  and informed her he needs to see me in person. Adeline told him she would call him back. She called back and informed him I would like to schedule a telehealth visit and then have him come in for a physical examination. He was confused by this. He then got a phone call from my office saying that we didn’t have his health plan in our system. He now has [facility]. The patient continues taking taking tamsulosin 0.4 mg once daily and tadalafil 5 mg once daily. He feels he is urinating well. At night he gets up about 2x to void. The stream is weak at night. Erections are improved since taking tadalafil 5 mg once daily. Has not been sexually active with another partner lately. He has been exercising more frequently. He walks on average 10,000 steps a day. \par \par I explained to the patient that I will be moving most of my work to telehealth visits. I explained the reasoning and rationale for this transition and why I prefer a telehealth visit prior to him coming in for physical examination and getting lab work prior. Pt understood. The patient would like to keep his 2022 visit. I was agreeable with him keeping his appointment so long as the week prior we have a telehealth visit. The pt understood and was agreeable. He understood the plan. \par On 2021, the patient's prolactin was elevated at 20.7. I had requested that he see an endocrinologist, however he was unable to. He informs me that if his laboratory work is done through [facility], it will not cost him much. Therefore, i am sending the patient for laboratory work at [facility]. Will recheck prolactin level.  \par Pt will continue taking tamsulosin 0.4 mg once daily after dinner. Will continue tadalafil 5 mg once daily. I prescribed the patient Alfuzosin ER 10 mg, once daily after breakfast. I informed the patient of lightheadedness as a side effect. It was my original recommendation to increase tamsulosin to BID, however patient states he was disturbed by the decreased semen volume when he used to take tamsulosin BID.  \par I informed the patient that I will check with billing regarding his health insurance and if I accept it. I assured him that I or someone I trust will be calling him back about it. If I accept his insurance there will be no problems but if I don’t he will get a list of approved providers and I will recommend him to whoever I think is best and send his records. If we accept his insurance, I will recommend the patient to a PCP, since he states he does not have one.  \par \par 10/26/2022:  presents today for a follow up audio only telephone visit for which he gave permission for. Pt currently takes tadalafil 5 mg once daily, tamsulosin 0.4 mg once daily, and alfuzosin ER 10 mg once daily. The substitution of alfuzosin for one tamsulosin was to decrease the impact on his semen volume. He reports that his semen volume is improved. He reports that his urination has been improved by these medications. Some nights he wakes 2-3x to urinate and others he can go 4-5 hours without waking up. Certain nights his stream is powerful and other nights he finds it to be more of a trickle. Ever since he has been taking these medications, his urinary urgency has gone away. He denies any ill effects from any of the medications. He is now retired from being a . He retired in February. He now has a part time job. Since this part time job he has been taking the tamsulosin much later at night and not after a meal. Pt states that when he stands to urinate, it can get messy due to spray and deviating stream. \par \par Reviewed and discussed laboratory work of 8/15/2022 in detail with the patient. Creatinine was 0.87. PSA  was 1.7 with a percent free of 0.4. \par I advised the patient to take tamsulosin 0.4 mg once daily about 15-20 minutes after a meal for better absorption. I advised him to bring a snack with him to his part time job so he can take it. Pt was agreeable. Will continue alfuzosin ER 10 mg once daily and tadalafil 5 mg once daily. Prescriptions were renewed. \par Advised the patient to sit when he urinates to avoid making a mess from a spray stream. I will examine urethral meatus when he comes in person. Will do a bladder scan as well. \par Pt inquired about medications that shrink the prostate. We discussed the possibility of finasteride and dutasteride as well as their ill effects. \par Pt is scheduled to see me for an in person office visit 2022 for MORENA, PE of urethral meatus, and bladder scan. \par \par 2022:  presents today for a follow up. Has been taking tamsulosin 0.4 mg once daily, alfuzosin ER 10 mg once daily and tadalafil 5 mg once daily. Has urinary frequency in the morning when he is driving but this is usually right after his morning cup of coffee. As the day goes on, his urination is fairly good. Voids about every 3-4 hours. Nocturia x1-2, however stream can be a trickle during the night. Pt is able to get erections but has not been very sexually active since the pandemic. 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 Retired from his career a few months ago. \par \par The knee-chest position was utilized for the MORENA. Normal seminal vesicles. Anal tone is normal. Little anal skin tag at 11 o'clock. Erythema in the naval crease and above, looks like a little bit of eczema. The prostate is V shaped, wider than high, non tender, with normal texture, discrete borders, and no nodules. It is a 15 gram transurethral resection size. No rectal mucosal lesions. No gross blood on the examining finger. Pt uncomfortable with the exam but able to cooperate. \par \par Advised patient to see a dermatologist for erythema in the naval crease. Advised to use hydrocortisone cream until then. \par \par I increased the dosage of Alfuzosin ER 10 mg from once daily to twice daily. Will continue tamsulosin 0.4 mg once daily after a meal and tadalafil 5 mg once daily. \par \par The patient produced a urine sample at [facility] which will be sent for urinalysis, urine cytology, and urine culture. \par \par Patient will have a telehealth visit in 2-3 weeks to discuss the results of his lab work and discuss increased dose of alfuzosin. Will RTO in one year for annual wellness exam. \par \par 2023  presents today for a follow up audio only telephone visit for which he gave permission for. \par Pt currently takes tadalafil 5 mg once daily, tamsulosin 0.4 mg once daily bedtime, and alfuzosin ER 10 mg BID. He has not noticed an improvement since taking alfuzosin BID and would prefer to take it once daily. Patient denies lightheadedness, nausea. Pt has results from past years to review. 2021 he reports elevated Estrogen at 311 done at [facility] and In 2022 it is 168. Pt also reports weight loss during this time period. Creatine was 0.8, FSH and LH were normal, Testosterone was 340 and PSA was 1.7. He reports fluctuation of PSA from 2.29 - 1.7. Patient urinary concerns include, after laying flat, he feels urinary urgency which increases when close to destination. Pt reports sleeping 6-7 hours with nocturia x2. He reports that he recently lost his mother at the age of 97 due to fall and breaking of hip. PT also reports feeling stressed since this occurred. He feels he gets more tired during the day, taking a nap every afternoon. He has cut down on cups of coffee down to two cups daily. Patient sexual history include masturbation. He reports decreased semen volume when ejaculating which is normal as he takes tamsulosin. With tadalafil he proclaims increased arousal, sometimes easily but without it the pt has not tested the feeling. \par \par Pt denies new medication for urinary frequency and was advised to cut down on caffeine intake. \par \par Patient will continue taking tadalafil 5 mg once daily. He informs me this needs pre authorization and I will ask my nurse to assist us with this. He will also continue taking tamsulosin 0.4 mg once daily and reduce alfuzosin ER 10 mg to once daily. \par \par Pt will see new PCP Dr. Martínez located in [place]. He will also see a dermatologist. I have requested that he send me copies of notes from both. \par \par PT will RTO in 1 year, unless clinically stated. \par \par Preparation, in person office visit, and coordination of care: 30 minutes.

## 2023-01-12 ENCOUNTER — APPOINTMENT (OUTPATIENT)
Dept: INTERNAL MEDICINE | Facility: CLINIC | Age: 63
End: 2023-01-12
Payer: COMMERCIAL

## 2023-01-12 VITALS
TEMPERATURE: 97.8 F | BODY MASS INDEX: 32.37 KG/M2 | DIASTOLIC BLOOD PRESSURE: 72 MMHG | HEIGHT: 72 IN | HEART RATE: 71 BPM | SYSTOLIC BLOOD PRESSURE: 134 MMHG | WEIGHT: 239 LBS | OXYGEN SATURATION: 98 %

## 2023-01-12 VITALS — DIASTOLIC BLOOD PRESSURE: 65 MMHG | SYSTOLIC BLOOD PRESSURE: 122 MMHG

## 2023-01-12 DIAGNOSIS — G47.10 HYPERSOMNIA, UNSPECIFIED: ICD-10-CM

## 2023-01-12 DIAGNOSIS — Z82.3 FAMILY HISTORY OF STROKE: ICD-10-CM

## 2023-01-12 DIAGNOSIS — Z80.42 FAMILY HISTORY OF MALIGNANT NEOPLASM OF PROSTATE: ICD-10-CM

## 2023-01-12 PROCEDURE — 99204 OFFICE O/P NEW MOD 45 MIN: CPT

## 2023-01-12 NOTE — HEALTH RISK ASSESSMENT
[Patient reported colonoscopy was abnormal] : Patient reported colonoscopy was abnormal [None] : None [Fully functional (bathing, dressing, toileting, transferring, walking, feeding)] : Fully functional (bathing, dressing, toileting, transferring, walking, feeding) [Fully functional (using the telephone, shopping, preparing meals, housekeeping, doing laundry, using] : Fully functional and needs no help or supervision to perform IADLs (using the telephone, shopping, preparing meals, housekeeping, doing laundry, using transportation, managing medications and managing finances) [Reports changes in hearing] : Reports no changes in hearing [Reports changes in vision] : Reports no changes in vision [ColonoscopyDate] : 2021 [ColonoscopyComments] : polyp

## 2023-01-12 NOTE — HISTORY OF PRESENT ILLNESS
[FreeTextEntry1] : needs new   md  on rx  for bp  and lipids and for LUTS  not  much exercise  he  says he needs naps  during the day.  he  snores  .he smoked 30 years  about  3/4 ppd

## 2023-01-12 NOTE — PHYSICAL EXAM
[Normal] : no acute distress, well nourished, well developed and well-appearing [Normal Sclera/Conjunctiva] : normal sclera/conjunctiva [Normal Outer Ear/Nose] : the outer ears and nose were normal in appearance [No JVD] : no jugular venous distention [Supple] : supple [No Respiratory Distress] : no respiratory distress  [No Accessory Muscle Use] : no accessory muscle use [Clear to Auscultation] : lungs were clear to auscultation bilaterally [Normal Rate] : normal rate  [Regular Rhythm] : with a regular rhythm [Normal S1, S2] : normal S1 and S2 [No Edema] : there was no peripheral edema [Soft] : abdomen soft [Non Tender] : non-tender [Non-distended] : non-distended [No HSM] : no HSM

## 2023-01-24 ENCOUNTER — NON-APPOINTMENT (OUTPATIENT)
Age: 63
End: 2023-01-24

## 2023-01-24 VITALS — WEIGHT: 239 LBS | BODY MASS INDEX: 32.37 KG/M2 | HEIGHT: 72 IN

## 2023-01-24 NOTE — REASON FOR VISIT
[Home] : at home, [unfilled] , at the time of the visit. [Medical Office: ([facility])___] : at the medical office located in  [Verbal consent obtained from patient] : the patient, [unfilled] [Annual Follow-Up] : an annual follow-up visit [Review of Eligibility] : review of eligibility [Low-Dose CT Screening Discussion] : low-dose CT lung cancer screening discussion [Virtual Visit] : virtual visit

## 2023-01-24 NOTE — HISTORY OF PRESENT ILLNESS
[Current] : Current [TextBox_13] : Referred by Dr. Wiley\par \par Mr. PREMA GUTIÉRREZ is a 62 year old man with a history of nicotine dependence\par \par  Over the telephone today we reviewed and confirmed that the patient meets screening eligibility criteria:\par \par -Age: 62 years old\par \par Smoking status:\par \par -Current smoker\par \par -Number of pack(s) per day: .8x 38, now .5 ppd\par \par -Number of years smoked: 38\par \par -number of pack years: 30\par \par \par Mr. GUTIÉRREZ denies any personal history of lung cancer. Denies any s/s of lung cancer. No lung cancer in a 1st degree relative. Denies any history of lung disease. Denies any history of occupational exposures\par  [PacksperYear] : 30

## 2023-01-25 ENCOUNTER — APPOINTMENT (OUTPATIENT)
Dept: CT IMAGING | Facility: IMAGING CENTER | Age: 63
End: 2023-01-25
Payer: COMMERCIAL

## 2023-01-25 ENCOUNTER — OUTPATIENT (OUTPATIENT)
Dept: OUTPATIENT SERVICES | Facility: HOSPITAL | Age: 63
LOS: 1 days | End: 2023-01-25
Payer: COMMERCIAL

## 2023-01-25 DIAGNOSIS — G47.10 HYPERSOMNIA, UNSPECIFIED: ICD-10-CM

## 2023-01-25 PROCEDURE — 71271 CT THORAX LUNG CANCER SCR C-: CPT | Mod: 26

## 2023-01-25 PROCEDURE — 71271 CT THORAX LUNG CANCER SCR C-: CPT

## 2023-02-01 ENCOUNTER — NON-APPOINTMENT (OUTPATIENT)
Age: 63
End: 2023-02-01

## 2023-02-13 ENCOUNTER — NON-APPOINTMENT (OUTPATIENT)
Age: 63
End: 2023-02-13

## 2023-02-27 ENCOUNTER — APPOINTMENT (OUTPATIENT)
Dept: PULMONOLOGY | Facility: CLINIC | Age: 63
End: 2023-02-27
Payer: COMMERCIAL

## 2023-02-27 VITALS
DIASTOLIC BLOOD PRESSURE: 73 MMHG | HEART RATE: 79 BPM | RESPIRATION RATE: 15 BRPM | WEIGHT: 238 LBS | TEMPERATURE: 97 F | SYSTOLIC BLOOD PRESSURE: 113 MMHG | BODY MASS INDEX: 32.23 KG/M2 | HEIGHT: 72 IN | OXYGEN SATURATION: 95 %

## 2023-02-27 PROCEDURE — 99203 OFFICE O/P NEW LOW 30 MIN: CPT

## 2023-02-27 NOTE — REVIEW OF SYSTEMS
[Negative] : Endocrine [TextBox_3] : Weight gain with smoking cessation attempts [TextBox_14] : Intermittent rhinorrhea [TextBox_30] : Intermittent wheezing at night [TextBox_101] : Rash involving hands, arms, chest, back

## 2023-02-27 NOTE — ASSESSMENT
[FreeTextEntry1] : Labs: None recent.\par \par Chest CT (1/2023):\par IMPRESSION: Stable small nodule in the right upper lobe when compared to previous exam.\par Lung RADS 2: Category benign appearance\par Continue screening with low-dose CT scan of the chest in one year.\par \par PFTs: Not yet obtained.\par \par A/P:\par 61 yo M h/o BPH, prostatitis, obesity, and tobacco use is evaluated in pulmonary for multiple questions after his [facility] LDCT.\par \par Mr. Canela has mild symptoms, including nightly wheezing and increased exertion to perform the same activities. I agree with Dr. Kessler's plan for a sleep study and stress test. On his CT, there is very mild emphysema, for which I would favor PFTs, a 6MWT, and an A1AT level. Mr. Canela would like to discuss the A1AT test with his insurance company. For his wheezing, we discussed tobacco cessation and a trial of Albuterol PRN. He is in the preparation phase.\par \par For his lung parenchyma, there is a 3 mm RUL nodule that was stable in comparison to 2020; repeat LDCT in 1/2024 is indicated.\par \par 1. Tobacco use\par 2. Mild emphysema\par 3. Snoring\par 4. Obesity\par 5. RUL small pulmonary nodule\par \par -recommended tobacco cessation; he is planning to start NRT soon\par -check PFTs, 6MWT, and A1AT\par -agree with sleep study and cardiac stress test\par -we discussed the benefits of regular physical activity and weigh tloss\par -plan chest CT in 1/2024\par -Vaccinations: not yet discussed\par -follow-up with me in 1-2 months \par

## 2023-02-27 NOTE — HISTORY OF PRESENT ILLNESS
[Current] : current [>= 20 pack years] : >= 20 pack years [TextBox_4] : I saw and evaluated Mr. Michael Canela after his lung cancer screening LDCT. In review, Mr. Canela is a 63 yo M h/o BPH, prostatitis, HLD, obesity, and tobacco use. Mr. Canela recently established care with Dr. Kessler. Mr. Kessler had questions about his lung function, lung cancer screening, and pulmonary nodules.\par \par Today, Mr. Canela reports feeling "ok." After his LDCT, he met with the tobacco cessation team and was prescribed Nicotine replacement, though he hasn't started it yet. Mr. Canela denies any history of childhood or adult-onset lung disease. He has had several episodes of bronchitis in his life, though no ER visits or hospitalizations. Over the last several months, he has noticed intermittent high pitched breath sounds (? wheezing) at night. For his job, he used to walk long distances on a daily basis. Currently, he feels that he can perform a similar amount of activity, though it feels slightly more difficult. He has a daily cough productive of clear/yellow sputum (no hemoptysis. He estimates feeling dyspneic after climbing 5-6 flights of stairs. He has never used an inhaler before. In discussing his symptoms with Dr. Kessler, a sleep study and treadmill cardiac stress test are planned. He endorses weight gain in the process of cutting back on cigarettes. He has had rhinorrhea intermittently.\par \par Mr. Canela estimates smoking 8-10 cigarettes/day.\par \par PMH:\par Prostatitis\par BPH\par HLD\par Laryngopharyngeal reflux disease\par Tobacco use\par Obesity\par \par PSH: \par Bilateral knee surgery, meniscus repair\par \par FH:\par Mom  of heart disease at age 97; h/o CKD\par Dad with CVA and carotid artery stenosis\par Grandfather with DM\par Grandmother with bone cancer\par \par SH:\par Mr. Canela was born in [place] and live [facility]. He is a retired ; he worked for 30 years. He smoked cigarettes at 1/2 pack/day between the ages of [age] (20 pack-years). He drinks alcohol occasionally. He has used marijuana and cocaine in the remote past. \par \par He endorses exposure to fumes and birds in prior work. He used to work for a [employer] company. He denies exposure to Asbestos, Tuberculosis, mold, dust, smoke, heavy metals, Beryllium, or hot tubs. [TextBox_11] : 1/2 [TextBox_13] : 40

## 2023-02-27 NOTE — CONSULT LETTER
[Dear  ___] : Dear  [unfilled], [Courtesy Letter:] : I had the pleasure of seeing your patient, [unfilled], in my office today. [Please see my note below.] : Please see my note below. [Consult Closing:] : Thank you very much for allowing me to participate in the care of this patient.  If you have any questions, please do not hesitate to contact me. [Sincerely,] : Sincerely, [FreeTextEntry1] : Dr. Kessler- Mr. Canela asked for a pulmonary evaluation after his recent screening LDCT. He has mild emphysema and on-going tobacco use. I agree with you rplan for a stress test and sleep study. I will see him back in 1-2 months for breathing tests and a 6MWT.\par \par Please call me if you have additional thoughts.\par \par Tee Benites\par C: [phone] [FreeTextEntry3] : Tee Benites

## 2023-04-03 ENCOUNTER — APPOINTMENT (OUTPATIENT)
Dept: CARDIOLOGY | Facility: CLINIC | Age: 63
End: 2023-04-03
Payer: COMMERCIAL

## 2023-04-03 DIAGNOSIS — R94.31 ABNORMAL ELECTROCARDIOGRAM [ECG] [EKG]: ICD-10-CM

## 2023-04-03 DIAGNOSIS — R06.09 OTHER FORMS OF DYSPNEA: ICD-10-CM

## 2023-04-03 PROCEDURE — 93015 CV STRESS TEST SUPVJ I&R: CPT

## 2023-04-03 PROCEDURE — 99204 OFFICE O/P NEW MOD 45 MIN: CPT | Mod: 25

## 2023-04-24 ENCOUNTER — APPOINTMENT (OUTPATIENT)
Dept: PULMONOLOGY | Facility: CLINIC | Age: 63
End: 2023-04-24
Payer: COMMERCIAL

## 2023-04-24 PROCEDURE — 94729 DIFFUSING CAPACITY: CPT

## 2023-04-24 PROCEDURE — 99406 BEHAV CHNG SMOKING 3-10 MIN: CPT

## 2023-04-24 PROCEDURE — ZZZZZ: CPT

## 2023-04-24 PROCEDURE — 99213 OFFICE O/P EST LOW 20 MIN: CPT | Mod: 25

## 2023-04-24 PROCEDURE — 94618 PULMONARY STRESS TESTING: CPT

## 2023-04-24 PROCEDURE — 94726 PLETHYSMOGRAPHY LUNG VOLUMES: CPT

## 2023-04-24 PROCEDURE — 94010 BREATHING CAPACITY TEST: CPT

## 2023-04-25 ENCOUNTER — NON-APPOINTMENT (OUTPATIENT)
Age: 63
End: 2023-04-25

## 2023-04-25 NOTE — HISTORY OF PRESENT ILLNESS
[Current] : current [>= 20 pack years] : >= 20 pack years [TextBox_4] : Mr. Michael Canela returned to clinic today. In review, Mr. Canela is a 61 yo M h/o BPH, prostatitis, HLD, obesity, and tobacco use. Mr. Canela recently established care with Dr. Wiley. \par \par I first saw Mr. Canela in 2023 when he had questions about lung function, wheezing, pulmonary nodules, sleep studies, and tobacco cessation. We discussed tobacco cessation, trial of Albuterol, and planned PFT acquisition.\par \par Since our last visit, Mr. Canela underwent his cardiac stress test, though he hasn't received results, yet.\par \par Today, Mr. Canela reports feeling well. He is still emotionally dealing with his mother's death in 2022. He closed on his parents' house on 23 and had to move all of their household items. He is dealing with sadness and anxiety; he recently attended a seminar, which was helpful. He does feel depressed, but he denies suicidal or homicidal ideations. He has used therapy in the past, which helped. He has not obtained the sleep study, yet. For his lung disease, he has not had respiratory exacerbations requiring hospitalization, ER visit, or antibiotics/Prednisone. He continues to smoke 8-10 cigarettes/day and has not initiated nicotine replacement therapy, yet. With Albuterol use, he has noticed less wheezing, and he is sleeping better.\par \par PMH:\par Prostatitis\par BPH\par HLD\par Laryngopharyngeal reflux disease\par Tobacco use\par Obesity\par Emphysema\par \par PSH: \par Bilateral knee surgery, meniscus repair\par \par FH:\par Mom  of heart disease at age 97; h/o CKD ( in 2022)\par Dad with CVA and carotid artery stenosis\par Grandfather with DM\par Grandmother with bone cancer\par \par SH:\par Mr. Canela was born in [place] and lives in [place]. He is a retired ; he worked for 30 years. He smoked cigarettes at 1/2 pack/day between the ages of [age] (20 pack-years). He drinks alcohol occasionally. He has used marijuana and cocaine in the remote past. \par \par He endorses exposure to fumes and birds in prior work. He used to work for a [employer] company. He denies exposure to Asbestos, Tuberculosis, mold, dust, smoke, heavy metals, Beryllium, or hot tubs. [TextBox_11] : 1/2 [TextBox_13] : 40

## 2023-04-25 NOTE — CONSULT LETTER
[Courtesy Letter:] : I had the pleasure of seeing your patient, [unfilled], in my office today. [Please see my note below.] : Please see my note below. [Sincerely,] : Sincerely, [FreeTextEntry2] : Fermin Wiley [FreeTextEntry1] : Dr. Wiley- I saw Mr. Canela today. He has very mild emphysema and reassuring PFTs. I think his tobacco cessation is limited by his anxiety and depressive symptoms, for which a mental health visit for therapy and/or medication may be beneficial. Thank you for helping me facilitate that referral. I will see him back in follow-up in 1 month. [FreeTextEntry3] : Tee Benites [DrYobany  ___] : Dr. PEREZ

## 2023-04-25 NOTE — PHYSICAL EXAM
[No Acute Distress] : no acute distress [Normal Oropharynx] : normal oropharynx [IV] : Mallampati Class: IV [Normal Appearance] : normal appearance [Normal Rate/Rhythm] : normal rate/rhythm [Normal S1, S2] : normal s1, s2 [No Murmurs] : no murmurs [No Resp Distress] : no resp distress [Clear to Auscultation Bilaterally] : clear to auscultation bilaterally [No Abnormalities] : no abnormalities [Benign] : benign [Normal Gait] : normal gait [No Clubbing] : no clubbing [No Cyanosis] : no cyanosis [FROM] : FROM [No Edema] : no edema [Normal Color/ Pigmentation] : normal color/ pigmentation [No Focal Deficits] : no focal deficits [Oriented x3] : oriented x3 [Normal Affect] : normal affect

## 2023-04-25 NOTE — ASSESSMENT
[FreeTextEntry1] : Labs: \par 12/2022\par Na 140, K 4.1, Cl 105, HCO3 24, BUN/creat 17/0.8, Ca 9.4\par Alk phos 72, Albumin 4.2\par \par Chest CT (1/2023):\par IMPRESSION: Stable small nodule in the right upper lobe when compared to previous exam.\par Lung RADS 2: Category benign appearance\par Continue screening with low-dose CT scan of the chest in one year.\par \par PFTs:          4/2023\par FEV1/FVC    76%\par FEV1            3.74, 109%\par FVC             4.89, 107%\par TLC              7.82, 116%\par RV                2.46, 112%\par DLCO            23.3, 84%\par \par 6MWT\par 4/2023: 6MWT distance 441 meters (1447 feet); ishmael SpO2 97%.\par \par A/P:\par 63 yo M h/o obesity, tobacco use, snoring, and mild emphysema returns to clinic. \par \par Mr. Canela is feeling well and not having exacerbations. He did not schedule his sleep study or attempt tobacco cessation, yet due to his anxiety and depressive symptoms related to his mother's recent death and multiple life changes in the last 1-2 years (relationship changes, moving, correction). He is beginning to feel better, but he does describe depressive symptoms without SI/HI. He is interested in re-initiating therapy, which I think would greatly improve his ability to pursue tobacco smoking cessation. Currently, Mr. Canela is in the contemplation phase of cessation. I will reach out to Dr. Wiley for options\par \par Regarding his mild radiographic emphysema, his PFTs are re-assuring. The improvement in wheezing with Albuterol supports a component of chronic bronchitis. We were unable to obtain the CAT due to discussing other issues. At next visit, we will discuss his symptom severity to discuss a long-acting bronchodilator.\par \par For his lung parenchyma, there is a 3 mm RUL nodule that was stable in comparison to 2020; repeat LDCT in 1/2024 is indicated.\par \par For his snoring, I recommended that he obtain the sleep study.\par \par 1. Mild emphysema\par 2. Tobacco smoking\par 3. Snoring\par 4. Obesity\par 5. RUL pulmonary nodule, 3 mm\par 6. Anxiety and depressive symptoms, likely related to grief and significant life changes recently\par \par -will discuss mental health referral with Dr. Wiley\par -recommended tobacco cessation; he is planning to start NRT soon\par -recommended A1AT; he will discuss with his insurance company\par -discussed the benefits of weight loss via reduced caloric intake and regular physical activity; we discussed walking until his upcoming cardiology evaluation\par -agree with sleep study\par -plan chest CT in 1/2024\par -Vaccinations: PCV-20 at next visit\par -follow-up with me in 1-2 months for CAT and to discuss PCV-20\par

## 2023-04-25 NOTE — REVIEW OF SYSTEMS
[Negative] : Endocrine [TextBox_14] : Intermittent rhinorrhea [TextBox_3] : Weight gain with smoking cessation attempts [TextBox_30] : Dyspnea with heavy exertion [TextBox_101] : Rash involving hands, arms, chest, back [TextBox_134] : Anxiety and depressive symptoms; denies SI/HI

## 2023-05-01 ENCOUNTER — APPOINTMENT (OUTPATIENT)
Dept: CT IMAGING | Facility: CLINIC | Age: 63
End: 2023-05-01
Payer: COMMERCIAL

## 2023-05-01 ENCOUNTER — OUTPATIENT (OUTPATIENT)
Dept: OUTPATIENT SERVICES | Facility: HOSPITAL | Age: 63
LOS: 1 days | End: 2023-05-01
Payer: COMMERCIAL

## 2023-05-01 DIAGNOSIS — R94.31 ABNORMAL ELECTROCARDIOGRAM [ECG] [EKG]: ICD-10-CM

## 2023-05-01 DIAGNOSIS — R06.09 OTHER FORMS OF DYSPNEA: ICD-10-CM

## 2023-05-01 PROCEDURE — 75574 CT ANGIO HRT W/3D IMAGE: CPT

## 2023-05-01 PROCEDURE — 75574 CT ANGIO HRT W/3D IMAGE: CPT | Mod: 26

## 2023-05-05 ENCOUNTER — TRANSCRIPTION ENCOUNTER (OUTPATIENT)
Age: 63
End: 2023-05-05

## 2023-06-05 ENCOUNTER — APPOINTMENT (OUTPATIENT)
Dept: PULMONOLOGY | Facility: CLINIC | Age: 63
End: 2023-06-05
Payer: COMMERCIAL

## 2023-06-05 VITALS
WEIGHT: 249 LBS | HEART RATE: 80 BPM | OXYGEN SATURATION: 97 % | BODY MASS INDEX: 33.72 KG/M2 | RESPIRATION RATE: 16 BRPM | SYSTOLIC BLOOD PRESSURE: 132 MMHG | TEMPERATURE: 97.5 F | DIASTOLIC BLOOD PRESSURE: 78 MMHG | HEIGHT: 72 IN

## 2023-06-05 PROCEDURE — 99213 OFFICE O/P EST LOW 20 MIN: CPT

## 2023-06-05 NOTE — PHYSICAL EXAM
[No Acute Distress] : no acute distress [Normal Oropharynx] : normal oropharynx [IV] : Mallampati Class: IV [Normal Appearance] : normal appearance [Normal Rate/Rhythm] : normal rate/rhythm [Normal S1, S2] : normal s1, s2 [No Murmurs] : no murmurs [No Resp Distress] : no resp distress [Clear to Auscultation Bilaterally] : clear to auscultation bilaterally [No Abnormalities] : no abnormalities [Benign] : benign [Normal Gait] : normal gait [No Clubbing] : no clubbing [No Cyanosis] : no cyanosis [No Edema] : no edema [FROM] : FROM [Normal Color/ Pigmentation] : normal color/ pigmentation [No Focal Deficits] : no focal deficits [Oriented x3] : oriented x3 [Normal Affect] : normal affect

## 2023-06-05 NOTE — CONSULT LETTER
[Dear  ___] : Dear  [unfilled], [Courtesy Letter:] : I had the pleasure of seeing your patient, [unfilled], in my office today. [Consult Closing:] : Thank you very much for allowing me to participate in the care of this patient.  If you have any questions, please do not hesitate to contact me. [Sincerely,] : Sincerely, [FreeTextEntry2] : Jonah Wiley [FreeTextEntry1] : Dr. Wiley- I saw Mr. Canela today. He is feeling well. He has found a support group that is helping with his grief. He has not pursued the sleep study since he has tried CPAP in the past and had difficulty tolerating it. PRN Albuterol is helping. We discussed regular physical activity, smoking cessation, and weight loss. I will see him back in 6 months. \par \par Please call me with additional thoughts.\par \par Tee Benites\par C: [phone] [FreeTextEntry3] : Tee Benites [DrYobany  ___] : Dr. PEREZ

## 2023-06-05 NOTE — HISTORY OF PRESENT ILLNESS
[Current] : current [>= 20 pack years] : >= 20 pack years [TextBox_4] : Mr. Michael Canela returned to clinic today. In review, Mr. Canela is a 61 yo M h/o BPH, prostatitis, HLD, obesity, and tobacco use. Mr. Canela recently established care with Dr. Wiley. I first saw Mr. Canela in 2023 when he had questions about lung function, wheezing, pulmonary nodules, sleep studies, and tobacco cessation. We discussed tobacco cessation, trial of Albuterol, and planned PFT acquisition.\par \par I last saw Mr. Canela in 2023 when we discussed his grief after his mother's passing in 2022. Since he had previously had benefit from therapy, we discussed potentially returning to therapy. We also discussed smoking cessation.\par \par Today, Mr. Canela reports feeling "about the same." He has not had respiratory exacerbations requiring ER evaluation, hospitalization, nor antibiotics/steroids since our last visit. He has tried the Albuterol inhaler, and he thinks that it helps if he uses it at night. He spoke to the mental health RN, though their discussion addressed inpatient facilities, which he was not interested in. He has subsequently discussed therapy with a support group, and he thinks that he has found someone that he feels comfortable speaking with. Mr. Canela has tried to stay active via strengthening exercises. \par \par Regarding his sleep study, Mr. Canela reports undergoing a home sleep study in the past. He thinks the result was "borderline" for ENOC. He tried CPAP but wasn't able to tolerate it. He does not think he would want to try CPAP again.\par \par PMH:\par Prostatitis\par BPH\par HLD\par Laryngopharyngeal reflux disease\par Tobacco use\par Obesity\par Emphysema\par \par PSH: \par Bilateral knee surgery, meniscus repair\par \par FH:\par Mom  of heart disease at age 97; h/o CKD ( in 2022)\par Dad with CVA and carotid artery stenosis\par Grandfather with DM\par Grandmother with bone cancer\par \par SH:\par Mr. Canela was born in [place] and lives in [place]. He is a retired ; he worked for 30 years. He smoked cigarettes at 1/2 pack/day between the ages of [age] (20 pack-years). He drinks alcohol occasionally. He has used marijuana and cocaine in the remote past. \par \par He endorses exposure to fumes and birds in prior work. 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 He denies exposure to Asbestos, Tuberculosis, mold, dust, smoke, heavy metals, Beryllium, or hot tubs. [TextBox_11] : 1/2 [TextBox_13] : 40

## 2023-06-05 NOTE — ASSESSMENT
[FreeTextEntry1] : Labs: \par 12/2022\par Na 140, K 4.1, Cl 105, HCO3 24, BUN/creat 17/0.8, Ca 9.4\par Alk phos 72, Albumin 4.2\par \par Chest CT (1/2023):\par IMPRESSION: Stable small nodule in the right upper lobe when compared to previous exam.\par Lung RADS 2: Category benign appearance\par Continue screening with low-dose CT scan of the chest in one year.\par \par PFTs:          4/2023\par FEV1/FVC    76%\par FEV1            3.74, 109%\par FVC             4.89, 107%\par TLC              7.82, 116%\par RV                2.46, 112%\par DLCO            23.3, 84%\par \par 6MWT\par 4/2023: 6MWT distance 441 meters (1447 feet); ishmael SpO2 97%.\par \par COPD Assessment test\par 6/5/23: 8\par \par A/P:\par 61 yo M h/o obesity, tobacco use, snoring, and mild emphysema returns to clinic. \par \par Mr. Canela continues to do well from a lung perspective. He is not having exacerbations, and his symptoms are mild. He will continue PRN Albuterol. Due to his low symptom burden, we decided to continue GAGE rather than pursue LAMA or LABA/LAMA. We discussed the benefits of tobacco cessation. Mr. Canela was unsure if he wanted to receive a pneumococcal vaccination. We discussed that pneumococcal vaccinations are recommended for all patients >65 years of age and 19-64 years of age with comorbidities (e.g., tobacco use, emphysema). He would like to consider the PCV-20 further.\par \par For his snoring, he has not scheduled his sleep study since he is not sure that he would want treatment. We discussed that weight loss may facilitate improved snoring. We also discussed that there are other treatment options for ENOC in addition to CPAP. He will consider.\par \par For Mr. Canela's grief symptoms, he would like to continue his current meetings with his support group.\par \par 1. Mild emphysema\par 2. Tobacco smoking\par 3. Snoring\par 4. Obesity\par 5. RUL pulmonary nodule, 3 mm\par 6. Grief symptoms after his mother's death\par \par -discussed the benefits of tobacco cessation; he has NRT at home\par -he does not think that he wants to pursue A1AT, which is reasonable given his mild emphysema and normal PFTs\par -discussed the benefits of weight loss via reduced caloric intake and regular physical activity\par -he will reconsider the sleep study\par -plan chest CT in 1/2024\par -Vaccinations: I recommended PCV-20, he would like to consider at our next visit\par -follow-up with me in 6 months\par

## 2023-09-11 ENCOUNTER — APPOINTMENT (OUTPATIENT)
Dept: OTOLARYNGOLOGY | Facility: CLINIC | Age: 63
End: 2023-09-11
Payer: COMMERCIAL

## 2023-09-11 VITALS
SYSTOLIC BLOOD PRESSURE: 128 MMHG | HEIGHT: 72 IN | HEART RATE: 74 BPM | TEMPERATURE: 98 F | WEIGHT: 242 LBS | DIASTOLIC BLOOD PRESSURE: 74 MMHG | BODY MASS INDEX: 32.78 KG/M2

## 2023-09-11 DIAGNOSIS — J31.0 CHRONIC RHINITIS: ICD-10-CM

## 2023-09-11 DIAGNOSIS — K21.9 GASTRO-ESOPHAGEAL REFLUX DISEASE W/OUT ESOPHAGITIS: ICD-10-CM

## 2023-09-11 DIAGNOSIS — J34.2 DEVIATED NASAL SEPTUM: ICD-10-CM

## 2023-09-11 DIAGNOSIS — H61.23 IMPACTED CERUMEN, BILATERAL: ICD-10-CM

## 2023-09-11 PROCEDURE — G0268 REMOVAL OF IMPACTED WAX MD: CPT

## 2023-09-11 PROCEDURE — 99213 OFFICE O/P EST LOW 20 MIN: CPT | Mod: 25

## 2023-09-11 PROCEDURE — 92557 COMPREHENSIVE HEARING TEST: CPT

## 2023-09-11 PROCEDURE — 31231 NASAL ENDOSCOPY DX: CPT

## 2023-09-11 PROCEDURE — 92567 TYMPANOMETRY: CPT

## 2023-12-04 ENCOUNTER — APPOINTMENT (OUTPATIENT)
Dept: PULMONOLOGY | Facility: CLINIC | Age: 63
End: 2023-12-04
Payer: COMMERCIAL

## 2023-12-04 VITALS
BODY MASS INDEX: 33.32 KG/M2 | SYSTOLIC BLOOD PRESSURE: 119 MMHG | HEART RATE: 94 BPM | WEIGHT: 246 LBS | DIASTOLIC BLOOD PRESSURE: 76 MMHG | OXYGEN SATURATION: 98 % | TEMPERATURE: 98 F | RESPIRATION RATE: 15 BRPM | HEIGHT: 72 IN

## 2023-12-04 DIAGNOSIS — Z23 ENCOUNTER FOR IMMUNIZATION: ICD-10-CM

## 2023-12-04 PROCEDURE — G0009: CPT

## 2023-12-04 PROCEDURE — 90677 PCV20 VACCINE IM: CPT

## 2023-12-04 PROCEDURE — 99213 OFFICE O/P EST LOW 20 MIN: CPT | Mod: 25

## 2023-12-04 PROCEDURE — 99406 BEHAV CHNG SMOKING 3-10 MIN: CPT | Mod: 25

## 2023-12-04 RX ORDER — ALBUTEROL SULFATE 90 UG/1
108 (90 BASE) INHALANT RESPIRATORY (INHALATION)
Qty: 1 | Refills: 11 | Status: ACTIVE | COMMUNITY
Start: 2023-02-27 | End: 1900-01-01

## 2023-12-13 ENCOUNTER — APPOINTMENT (OUTPATIENT)
Dept: UROLOGY | Facility: CLINIC | Age: 63
End: 2023-12-13
Payer: COMMERCIAL

## 2023-12-13 VITALS
HEART RATE: 82 BPM | TEMPERATURE: 97.9 F | OXYGEN SATURATION: 97 % | BODY MASS INDEX: 33.32 KG/M2 | WEIGHT: 246 LBS | DIASTOLIC BLOOD PRESSURE: 79 MMHG | HEIGHT: 72 IN | SYSTOLIC BLOOD PRESSURE: 129 MMHG | RESPIRATION RATE: 16 BRPM

## 2023-12-13 DIAGNOSIS — E66.9 OBESITY, UNSPECIFIED: ICD-10-CM

## 2023-12-13 DIAGNOSIS — R82.89 OTHER ABNRM FNDNGS ON CYTOLOGICAL: ICD-10-CM

## 2023-12-13 PROCEDURE — 99417 PROLNG OP E/M EACH 15 MIN: CPT

## 2023-12-13 PROCEDURE — 99215 OFFICE O/P EST HI 40 MIN: CPT

## 2023-12-13 NOTE — REVIEW OF SYSTEMS
[Feeling Tired] : not feeling tired [Feeling Poorly] : not feeling poorly [Nocturia] : no nocturia [Difficulty Walking] : no difficulty walking [Anxiety] : no anxiety [Erectile Dysfunction] : no erectile dysfunction

## 2023-12-13 NOTE — PHYSICAL EXAM
[Normal Appearance] : normal appearance [Well Groomed] : well groomed [General Appearance - In No Acute Distress] : no acute distress [Edema] : no peripheral edema [Respiration, Rhythm And Depth] : normal respiratory rhythm and effort [Exaggerated Use Of Accessory Muscles For Inspiration] : no accessory muscle use [Abdomen Soft] : soft [Abdomen Tenderness] : non-tender [Costovertebral Angle Tenderness] : no ~M costovertebral angle tenderness [Normal Station and Gait] : the gait and station were normal for the patient's age [] : no rash [Oriented To Time, Place, And Person] : oriented to person, place, and time [Mood] : the mood was normal [Affect] : the affect was normal [de-identified] : The knee-chest position was utilized for the MORENA. Digital rectal exam found no suspicious rectal masses. Normal seminal vesicles. Anal tone is normal. The prostate is non tender, with normal texture, discrete borders, and no nodules. It is a 30 gram transurethral resection size. No rectal mucosal lesions. No gross blood on the examining finger.

## 2023-12-13 NOTE — ASSESSMENT
[FreeTextEntry1] : 10/21/19: Michael Canela is a 63 y/o male patient who presents today for an initial evaluation. For the past 4-5 years he has been seeing various urologists. He began taking Tamsulosin 0.4 mg BID to improve his urination. He became concerned when the amount of ejaculate decreased and as a result the Tamsulosin was decreased to one capsule per day. Urinary frequency is reported. He states that the stream is strong. Nocturia x 2 is reported. When he wakes up at night he states that the urinary stream is weaker than it is during the day. He believes that his urination is significantly improved since beginning the Tamsulosin. He states that his sexual function is decreased, erections occur less often. He is a  and drinks at least 2 quarts of water a day. His father was diagnosed with prostate cancer in his 70's. His father also had colon cancer. His mother was diagnosed with breast cancer. He denies a Hx and FHx of kidney stones. Currently smokes less than half a pack of cigarettes a day and is attempting to quit.  2020: 59 yr male with LUTS presents dysuria , freq and recent fever and chill, resolving with abx cefalexin    10/07/2020: 59 yr male Acute LUTs possible uti , resolved with levaquin, Doxazosin  to repeat ua micros  PSA afte MORENA.  Knee chest position was used. Digital rectal exam found no suspicious rectal masses. No rectal mucosal lesions. Anal tone is normal. The prostate is non tender, with normal texture, discrete borders, and no nodules. It is a 45 gram transurethral resection size. No gross blood on examining fingers.  PVR 348ml after urination.  after repeat urination.  Advised the patient to routinely double void.  Patient will continue taking Tamsulosin 0.4mg BID and will call office in 2 weeks to report status of urination.  RTO in 3 months for follow up.  2021: The patient presents today for a follow up. Patient appears concerned and very anxious although reports he does not usually get like this.Since his infection, he doesn't feel like he used to and reports feeling overall different which has been overwhelming. He used to wake up at night feeling an urge but now doesn't. Voids about 8-9x a day. Still has urinary frequency but denies urinary urgency. He reports drinking an adequate amount of liquids. Reports his father had prostate ca. His father had radiation seeds at 76 y/o that broke up and would cause blockages, causing his father a lot of pain, burning, and infections. Patient seems worried about going through the same that his father went through. The patient discontinued Pyridium 200 mg 1 tablet 3x a day after meals for burning. He was taking tamsulosin 0.4 mg 2x daily, however he went down to 1 after having ejaculation contraction problems. Before the pandemic he reports being sexually active. His libido is still good, however he reports that his erections are not as adequate as he'd like. He has high blood pressure and cholesterol. He works as a  for the [employer]. His last PSA was 3.79 on 10/20/2020 which is acceptable to watch. PVR is 171.  2021: Patient presents today for a telehealth visit for which he gave permission for. The patient was anxious today and apologized if he was asking questions that were detailed but he was very concerned since his father  at age 91, had pros ca and radioactive seed implants at 76 y/o and had many complications in terms of seeds entering the urethra and bladder causing pain, difficulties, and urinary symptoms. The patient said the seeds had broken up. The patient has been on tamsulosin once daily. He finds it optimal in helping some of his urinary symptoms, however he still does have a lot of symptoms. He had a UTI during my care and since then he no longer has the same sensation when he needs to urinate. He has a sense but no urge specifically at night. He finds that his stream sprays. He is going more frequently, every 2-3 hours which is bothersome to him. He reports his stream is weaker at night since the UTI. He was distressed that I wasn't ordering an MRI of the prostate for him, however I explained that I would write for one but he would not meet the insurance criteria so it most likely would not be authorized and that the current price was about 800$. Much of what he wanted an MRI for was not for detection of prostate cancer but he wanted a better understanding of anatomical problems. I agreed with him that an MRI would provide a better estimation of size and a better knowledge of the prostae than a regular digital rectal exam would, however I informed him that we can get valuable information as well from a transrectal US in terms of size and configuration. I also informed him that we can get information about configuration from cystoscopy as well. The patient was agreeable that he'd rather get the transrectal US before allotting for an MRI. His current copay for a telehealth visit is about 50$ and he informed me that he was looking not to come in or have a telehealth visit due to expenses involved since his financial means are limited at this current time. He'd be interested in an in person visit in October and would do the transrectal US at that time. I also asked him about blood work. I offered to put in the computer to do ahead of time but he was content on doing it the same day he came in. I talked about procedures to improve urination if medication management failed. I said since we are having difficulties with medical management of symptoms that we could do urodynamics and cystoscopy to better understand the anatomy and physiology relative to his difficulties. I explained the procedure in detail. Review risks in addition to the benefits. I informed him of possible risk of infection and discomfort. Reviewed procedures for improving urination by decreasing bladder outlet obstruction due to BPH. Discussed steam and water techniques We spoke about electrosurgical techniques and laser techniques for dealing with BPH resulting in bladder outlet obstruction. The patient has not been taking tadalafil ever day. He tried it only on days he wanted to have sex which he reports did help but he is not sexually active at the moment. He was paying 20$ at his local [facility], which I thought was acceptable. I advised him to check the price if he refills it as we can find him a place to charge him less if they are charging him more than 20$. He didn't want 30 pills since he is not active however I informed him it would improve his urination if taken every day. He admitted that when he was taking it sporadically it did help his urination some. I reviewed thoughts behind taking it every day. I believe his agreement to try it was sincere, placed a new prescription with refills. As noted above, I placed orders for a transrectal US to be scheduled on or around 10/6/2021. I told the patient that when he arrives on the day of, he should go first to give blood in our lab then have the US. Pt understands this.  2021: Patient presents today for prostate US. Prostate US results show normal echogenicity. Prostatic calcifications were visualized. No masses visualized. No obstruction of ejaculatory ducts. Seminal vesicles are normal size with no blood in it. +FHx of prostate cancer in father who was dx at 78y/o. Had radioactive seeds which caused pain and problems in him in that seeds broke up at as gravel. States ever since starting Tadalafil 5mg with Tamsulosin 0.4mg once daily half hour after dinner, feels he is ejaculating more. States he in the past took Tamsulosin twice daily, but was disturbed that it affected his ejaculate. States he had been celibate since the pandemic, but is interesting in returning. Since taking Tadalafil, has been ejaculating more and feels more satisfied when he masturbates. Strength of erections vary. Additionally, patient states he must urinate every 3-4 hours. Wakes 2x at night to urinate. States that since UTI he had 13 months ago, feels the urge to urinate has not been as severe but feels stream has been weaker since then. Dealing with some stress as mother is having speech problems s/p stroke and sees speech pathologist.    2021: Patient presents today for a follow up telephone visit for which he gave permission for. Currently on tadalafil 5 mg once daily and tamsulosin 0.4 mg once daily. Patient had lab work done on 2021. UA was good. Blood work demonstrated a PSA of 2.08. Estradiol level, prolactin, LH, and calcium were all normal. Creatinine was 0.9. Glucose was 106. Electrolytes and Alk phos were normal. Urine cytology was benign. Urine culture was no growth. Testosterone free was normal at 10.8, testosterone total was slightly low at 235, but with a normal free testosterone I would be reluctant to prescribe something to augment testosterone. Estrogens were elevated at 311 where the normal amount for males is within range of . The patient states that he has been feeling a little tired everyday around 3 pm where he feels like he could take a nap. On tamsulosin and tadalafil, his urination is improved.  2021: I attempted to call the patient at 6:56 PM. Patient was scheduled for 6:00 PM appointment. I left a voicemail stating that recent lab work shows that estrogen remains elevated and will consider starting Anastrazole to decrease estrogen levels or see an endocrinologist. He should reschedule telehealth appointment to discuss. I have provided the name of an endocrinologist Dr. Antonio Driscoll.  08/10/2022:  presents today for a telephone visit for which he gave permission for. He reports that he has been doing well but has been busy taking care of his mother who is 97. She is mostly lucid but memory is diminished. He shares the responsibility of caring for her with his brother. Pt expresses that he is discombobulated over all the phone calls he has been getting. The pt last saw me in 2021. He retired in 2022. In January he had to change his health plan. He had intention of returning this October and called my office yesterday morning. He was told he had an appt for 2022. After he hung up he got a phone call and VM requesting he get switched to a virtual appointment. He called and spoke to my  and informed her he needs to see me in person. Adeline told him she would call him back. She called back and informed him I would like to schedule a telehealth visit and then have him come in for a physical examination. He was confused by this. He then got a phone call from my office saying that we didn't have his health plan in our system. He now has [facility]. The patient continues taking taking tamsulosin 0.4 mg once daily and tadalafil 5 mg once daily. He feels he is urinating well. At night he gets up about 2x to void. The stream is weak at night. Erections are improved since taking tadalafil 5 mg once daily. Has not been sexually active with another partner lately. He has been exercising more frequently. He walks on average 10,000 steps a day.  I explained to the patient that I will be moving most of my work to telehealth visits. I explained the reasoning and rationale for this transition and why I prefer a telehealth visit prior to him coming in for physical examination and getting lab work prior. Pt understood. The patient would like to keep his 2022 visit. I was agreeable with him keeping his appointment so long as the week prior we have a telehealth visit. The pt understood and was agreeable. He understood the plan.  On 2021, the patient's prolactin was elevated at 20.7. I had requested that he see an endocrinologist, however he was unable to. He informs me that if his laboratory work is done through [facility], it will not cost him much. Therefore, i am sending the patient for laboratory work at [facility]. Will recheck prolactin level.  Pt will continue taking tamsulosin 0.4 mg once daily after dinner. Will continue tadalafil 5 mg once daily. I prescribed the patient Alfuzosin ER 10 mg, once daily after breakfast. I informed the patient of lightheadedness as a side effect. It was my original recommendation to increase tamsulosin to BID, however patient states he was disturbed by the decreased semen volume when he used to take tamsulosin BID.  I informed the patient that I will check with billing regarding his health insurance and if I accept it. I assured him that I or someone I trust will be calling him back about it. If I accept his insurance there will be no problems but if I don't he will get a list of approved providers and I will recommend him to whoever I think is best and send his records. If we accept his insurance, I will recommend the patient to a PCP, since he states he does not have one.  10/26/2022:  presents today for a follow up audio only telephone visit for which he gave permission for. Pt currently takes tadalafil 5 mg once daily, tamsulosin 0.4 mg once daily, and alfuzosin ER 10 mg once daily. The substitution of alfuzosin for one tamsulosin was to decrease the impact on his semen volume. He reports that his semen volume is improved. He reports that his urination has been improved by these medications. Some nights he wakes 2-3x to urinate and others he can go 4-5 hours without waking up. Certain nights his stream is powerful and other nights he finds it to be more of a trickle. Ever since he has been taking these medications, his urinary urgency has gone away. He denies any ill effects from any of the medications. He is now retired from being a . He retired in February. He now has a part time job. Since this part time job he has been taking the tamsulosin much later at night and not after a meal. Pt states that when he stands to urinate, it can get messy due to spray and deviating stream.  Reviewed and discussed laboratory work of 8/15/2022 in detail with the patient. Creatinine was 0.87. PSA was 1.7 with a percent free of 0.4.  I advised the patient to take tamsulosin 0.4 mg once daily about 15-20 minutes after a meal for better absorption. I advised him to bring a snack with him to his part time job so he can take it. Pt was agreeable. Will continue alfuzosin ER 10 mg once daily and tadalafil 5 mg once daily. Prescriptions were renewed.  Advised the patient to sit when he urinates to avoid making a mess from a spray stream. I will examine urethral meatus when he comes in person. Will do a bladder scan as well.  Pt inquired about medications that shrink the prostate. We discussed the possibility of finasteride and dutasteride as well as their ill effects.  Pt is scheduled to see me for an in person office visit 2022 for MORENA, PE of urethral meatus, and bladder scan.  2022:  presents today for a follow up. Has been taking tamsulosin 0.4 mg once daily, alfuzosin ER 10 mg once daily and tadalafil 5 mg once daily. Has urinary frequency in the morning when he is driving but this is usually right after his morning cup of coffee. As the day goes on, his urination is fairly good. Voids about every 3-4 hours. Nocturia x1-2, however stream can be a trickle during the night. Pt is able to get erections but has not been very sexually active since the pandemic. 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 Retired from his career a few months ago.  The knee-chest position was utilized for the MORENA. Normal seminal vesicles. Anal tone is normal. Little anal skin tag at 11 o'clock. Erythema in the naval crease and above, looks like a little bit of eczema. The prostate is V shaped, wider than high, non tender, with normal texture, discrete borders, and no nodules. It is a 15 gram transurethral resection size. No rectal mucosal lesions. No gross blood on the examining finger. Pt uncomfortable with the exam but able to cooperate.  Advised patient to see a dermatologist for erythema in the naval crease. Advised to use hydrocortisone cream until then.  I increased the dosage of Alfuzosin ER 10 mg from once daily to twice daily. Will continue tamsulosin 0.4 mg once daily after a meal and tadalafil 5 mg once daily.  The patient produced a urine sample at [facility] which will be sent for urinalysis, urine cytology, and urine culture.  Patient will have a telehealth visit in 2-3 weeks to discuss the results of his lab work and discuss increased dose of alfuzosin. Will RTO in one year for annual wellness exam.  2023  presents today for a follow up audio only telephone visit for which he gave permission for. Pt currently takes tadalafil 5 mg once daily, tamsulosin 0.4 mg once daily bedtime, and alfuzosin ER 10 mg BID. He has not noticed an improvement since taking alfuzosin BID and would prefer to take it once daily. Patient denies lightheadedness, nausea. Pt has results from past years to review. 2021 he reports elevated Estrogen at 311 done at [facility] and In 2022 it is 168. Pt also reports weight loss during this time period. Creatine was 0.8, FSH and LH were normal, Testosterone was 340 and PSA was 1.7. He reports fluctuation of PSA from 2.29 - 1.7. Patient urinary concerns include, after laying flat, he feels urinary urgency which increases when close to destination. Pt reports sleeping 6-7 hours with nocturia x2. He reports that he recently lost his mother at the age of 97 due to fall and breaking of hip. PT also reports feeling stressed since this occurred. He feels he gets more tired during the day, taking a nap every afternoon. He has cut down on cups of coffee down to two cups daily. Patient sexual history include masturbation. He reports decreased semen volume when ejaculating which is normal as he takes tamsulosin. With tadalafil he proclaims increased arousal, sometimes easily but without it the pt has not tested the feeling.  Pt denies new medication for urinary frequency and was advised to cut down on caffeine intake. Patient will continue taking tadalafil 5 mg once daily. He informs me this needs pre authorization and I will ask my nurse to assist us with this. He will also continue taking tamsulosin 0.4 mg once daily and reduce alfuzosin ER 10 mg to once daily. Pt will see new PCP Dr. Martínez located in [place]. He will also see a dermatologist. I have requested that he send me copies of notes from both. PT will RTO in 1 year, unless clinically stated.  2023: Mr. MICHAEL CANELA presents today for a follow up. He reports that he no longer has a PCP, would like a recommendation. PCP retired. Patient reports he has seen a number of different physicians this year. Saw a neurologist due to numbness in the right leg. Was found to have two lumbar discs that are arthritic. He has been doing PT on his own and his numbness improved. Pt reports a recent diagnosis of psoriasis. He admits it has been a stressful year after losing his mother. Has been using a steroid cream. His symptoms are mild at this time. In terms of his urination, he reports that he drinks about 2 cups of caffeinated coffee in the morning which causes some frequent urination in the morning hours. He did take a trip to [place] and forgot his tadalafil. He noticed that without the tadalafil 5 mg once daily, his urination was a bit worse. Stream was slower at night. In addition to tadalafil daily, he continues to take tamsulosin 0.4 mg once daily around 8 PM and alfuzosin ER 10 mg once daily in the morning. Nocturia x2 and feels he empties well. Pt has not been sexually active as of late with a partner. He does masturbate, gets a decent erection, and can reach ejaculation, however at times no fluid comes out due to tamsulosin.   The knee-chest position was utilized for the MORENA. Digital rectal exam found no suspicious rectal masses. Normal seminal vesicles. Anal tone is normal. The prostate is non tender, with normal texture, discrete borders, and no nodules. It is a 30 gram transurethral resection size. No rectal mucosal lesions. No gross blood on the examining finger.   Pt will provide a urine sample and blood work at [facility].   Renewed tadalafil 5 mg once daily, tamsulosin 0.4 mg once daily, and alfuzosin ER 10 mg once daily.    I recommended the patient to see Dr.Aldo Choi of primary care. He was provided with his contact information.   Patient will have a telehealth visit in 2-3 weeks to review and discuss lab results.    Preparation, in person office visit, and coordination of care took 70 minutes.

## 2023-12-13 NOTE — ADDENDUM
[FreeTextEntry1] : This note was authored by Kalpana Herrera working as a scribe for Dr.Gary Leggett. I, Dr. Chris Leggett have reviewed the content of this note and confirm it is true and accurate. I personally performed the history and physical examination and made all the decisions 12/13/2023

## 2023-12-13 NOTE — HISTORY OF PRESENT ILLNESS
[FreeTextEntry1] : 10/21/19: Felice Gutiérrez is a 61 y/o male patient who presents today for an initial evaluation. For the past 4-5 years he has been seeing various urologists. He began taking Tamsulosin 0.4 mg BID to improve his urination. He became concerned when the amount of ejaculate decreased and as a result the Tamsulosin was decreased to one capsule per day. Urinary frequency is reported. He states that the stream is strong. Nocturia x 2 is reported. When he wakes up at night he states that the urinary stream is weaker than it is during the day. He believes that his urination is significantly improved since beginning the Tamsulosin. He states that his sexual function is decreased, erections occur less often. He is a  and drinks at least 2 quarts of water a day. His father was diagnosed with prostate cancer in his 70's. His father also had colon cancer. His mother was diagnosed with breast cancer. He denies a Hx and FHx of kidney stones. Currently smokes less than half a pack of cigarettes a day and is attempting to quit.   2020: 59 yr male history of LUTS for 6 yrs going to urology. Present buring , freq, fever , pain at tip of penis, supra pubic pressure started one week. Fever resolved with abx cefalexin from urgent care. Freq and buring is resolving. Freq down to 2 hrly from q 45 min. Anxious .  . PSA 1.58 as at 10/19   10/7/2020: 59 yr male with acute  LUTs symptoms concerning for UTI ,now resolved after Levofloxacin 750mg take for 7 days and feels well today.  Doxazosin. Takes Tamsulosin 0.4mg BID.  Said pressure and burning and pain tip of penis resolved.  Wakes 2X nocte to pee.  Freq during the day .  Feels like do not empty bladder and feels urgency in the morning hours, but stream is good and no leakage. Feels increased urgency when driving. Pt concerned about low testosterone. Erections are adequate.  UA/Cx  show now bacterial growth.  Unremarkable  PSA  1.58 oct 2019  +FHx of bladder stone in father.   2021: The patient presents today for a follow up. Patient appears concerned and very anxious although reports he does not usually get like this.Since his infection, he doesn't feel like he used to and reports feeling overall different which has been overwhelming. He used to wake up at night feeling an urge but now doesn't. Voids about 8-9x a day. Still has urinary frequency but denies urinary urgency. He reports drinking an adequate amount of liquids. Reports his father had prostate ca. His father had radiation seeds at 78 y/o that broke up and would cause blockages, causing his father a lot of pain, burning, and infections. Patient seems worried about going through the same that his father went through. The patient discontinued Pyridium 200 mg 1 tablet 3x a day after meals for burning. He was taking tamsulosin 0.4 mg 2x daily, however he went down to 1 after having ejaculation contraction problems. Before the pandemic he reports being sexually active. His libido is still good, however he reports that his erections are not as adequate as he'd like. He has high blood pressure and cholesterol. He works as a  for the [employer]. His last PSA was 3.79 on 10/20/2020 which is acceptable to watch. PVR is 171.   2021: Patient was not seen in office today.   2021: Patient presents today for a telehealth visit for which he gave permission for. The patient was anxious today and apologized if he was asking questions that were detailed but he was very concerned since his father  at age 91, had pros ca and radioactive seed implants at 78 y/o and had many complications in terms of seeds entering the urethra and bladder causing pain, difficulties, and urinary symptoms. The patient said the seeds had broken up. The patient has been on tamsulosin once daily. He finds it optimal in helping some of his urinary symptoms, however he still does have a lot of symptoms. He had a UTI during my care and since then he no longer has the same sensation when he needs to urinate. He has a sense but no urge specifically at night. He finds that his stream sprays. He is going more frequently, every 2-3 hours which is bothersome to him. He reports his stream is weaker at night since the UTI. He was distressed that I wasn't ordering an MRI of the prostate for him, however I explained that I would write for one but he would not meet the insurance criteria so it most likely would not be authorized and that the current price was about 800$. Much of what he wanted an MRI for was not for detection of prostate cancer but he wanted a better understanding of anatomical problems. I agreed with him that an MRI would provide a better estimation of size and a better knowledge of the prostae than a regular digital rectal exam would, however I informed him that we can get valuable information as well from a transrectal US in  terms of size and configuration. I also informed him that we can get information about configuration from cystoscopy as well . The patient was agreeable that he'd rather get the transrectal US before allotting for an MRI. His current copay for a telehealth visit is about 50$ and he informed me that he was looking not to come in or have a telehealth visit due to expenses involved since his financial means are limited at this current time. He'd be interested in an in person visit in October and would do the transrectal US at that time. I also asked him about blood work. I offered to put in the computer to do ahead of time but he was content on doing it the same day he came in. I talked about procedures to improve urination if medication management failed. I said since we are having difficulties with medical management of symptoms that we could do urodynamics and cystoscopy to better understand the anatomy and physiology relative to his difficulties. I explained the procedure in detail. Review risks in addition to the benefits. I informed him of possible risk of infection and discomfort. Reviewed procedures for improving urination by decreasing bladder outlet obstruction due to BPH. Discussed steam and water techniques  We spoke about electrosurgical techniques and laser techniques for dealing with BPH resulting in bladder outlet obstruction. The patient has not been taking tadalafil ever day. He tried it only on days he wanted to have sex which he reports did help but he is not sexually active at the moment. He was paying 20$ at his local [facility], which I thought was acceptable. I advised him to check the price if he refills it as we can find him a place to charge him less if they are charging him more than 20$. He didn't want 30 pills since he is not active however I informed him it would improve his urination if taken every day. He admitted that when he was taking it sporadically it did help his urination some. I reviewed thoughts behind taking it every day. I believe his agreement to try it was sincere, placed a new prescription with refills. As noted above, I placed orders for a transrectal US to be scheduled on or around 10/6/2021. I told the patient that when he arrives on the day of, he should go first to give blood in our lab then have the US. Pt understands this.   2021: Patient presents today for prostate US. Prostate US results show normal echogenicity. Prostatic calcifications were visualized. No masses visualized. No obstruction of ejaculatory ducts. Seminal vesicles are normal size with no blood in it. +FHx of prostate cancer in father who was dx at 78y/o. Had radioactive seeds which caused pain and problems in him in that seeds broke up at as gravel. States ever since starting Tadalafil 5mg with Tamsulosin 0.4mg once daily half hour after dinner, feels he is ejaculating more. States he in the past took Tamsulosin twice daily, but was disturbed that it affected his ejaculate. States he had been celibate since the pandemic, but is interesting in returning. Since taking Tadalafil, has been ejaculating more and feels more satisfied when he masturbates. Strength of erections vary. Additionally, patient states he must urinate every 3-4 hours. Wakes 2x at night to urinate. States that since UTI he had 13 months ago, feels the urge to urinate has not been as severe. Dealing with some stress as mother is having speech problems and sees speech pathologist.   2021: Patient presents today for a follow up telephone visit for which he gave permission for. Currently on tadalafil 5 mg once daily and tamsulosin 0.4 mg once daily. Patient had lab work done on 2021. UA was good. Blood work demonstrated a PSA of 2.08. Estradiol level, prolactin, LH, and calcium were all normal. Creatinine was 0.9. Glucose was 106. Electrolytes and Alk phos were normal. Urine cytology was benign. Urine culture was no growth. Testosterone free was normal at 10.8, testosterone total was slightly low at 235, but with a normal free testosterone I would be reluctant to prescribe something to augment testosterone. Estrogens were elevated at 311 where the normal amount for males is within range of . The patient states that he has been feeling a little tired everyday around 3 pm where he feels like he could take a nap. On tamsulosin and tadalafil, his urination is improved.   2021: I attempted to call the patient at 6:56 PM. Patient was scheduled for 6:00 PM appointment. I left a voicemail stating that recent lab work shows that estrogen remains elevated and will consider starting Anastrazole to decrease estrogen levels or see an endocrinologist. He should reschedule telehealth appointment to discuss. I have provided the name of an endocrinologist Dr. Antonio Driscoll.   08/10/2022:  presents today for a telephone visit for which he gave permission for. He reports that he has been doing well but has been busy taking care of his mother who is 97. She is mostly lucid but memory is diminished. He shares the responsibility of caring for her with his brother. Pt expresses that he is discombobulated over all the phone calls he has been getting. The pt last saw me in 2021. He retired in 2022. In January he had to change his health plan. He had intention of returning this October and called my office yesterday morning. He was told he had an appt for 2022. After he hung up he got a phone call and VM requesting he get switched to a virtual appointment. He called and spoke to my  and informed her he needs to see me in person. Adeline told him she would call him back. She called back and informed him I would like to schedule a telehealth visit and then have him come in for a physical examination. He was confused by this. He then got a phone call from my office saying that we didn't have his health plan in our system. He now has [employer]. The patient continues taking taking tamsulosin 0.4 mg once daily and tadalafil 5 mg once daily. He feels he is urinating well. At night he gets up about 2x to void. The stream is weak at night. Erections are improved since taking tadalafil 5 mg once daily. Has not been sexually active with another partner lately. He has been exercising more frequently. He walks on average 10,000 steps a day.   10/26/2022:  presents today for a follow up audio only telephone visit for which he gave permission for. Pt currently takes tadalafil 5 mg once daily, tamsulosin 0.4 mg once daily, and alfuzosin ER 10 mg once daily. The substitution of alfuzosin for one tamsulosin was to decrease the impact on his semen volume. He reports that his semen volume is improved. He reports that his urination has been improved by these medications. Some nights he wakes 2-3x to urinate and others he can go 4-5 hours without waking up. Certain nights his stream is powerful and other nights he finds it to be more of a trickle. Ever since he has been taking these medications, his urinary urgency has gone away. He denies any ill effects from any of the medications. He is now retired from being a . He retired in February. He now has a part time job. Since this part time job he has been taking the tamsulosin much later at night and not after a meal. Pt states that when he stands to urinate, it can get messy due to spray and deviating stream.   2022:  presents today for a follow up. Has been taking tamsulosin 0.4 mg once daily, alfuzosin ER 10 mg once daily and tadalafil 5 mg once daily. Has urinary frequency in the morning when he is driving but this is usually right after his morning cup of coffee. As the day goes on, his urination is fairly good. Voids about every 3-4 hours. Nocturia x1-2, however stream can be a trickle during the night. Pt is able to get erections but has not been very sexually active since the pandemic. 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 Retired from his career a few months ago.   2023  presents today for a follow up audio only telephone visit for which he gave permission for.  Pt currently takes tadalafil 5 mg once daily, tamsulosin 0.4 mg once daily bedtime, and alfuzosin ER 10 mg BID. He has not noticed an improvement since taking alfuzosin BID and would prefer to take it once daily. Patient denies lightheadedness, nausea. Pt has results from past years to review. 2021 he reports elevated Estrogen at 311 done at [facility] and In 2022 it is 168. Pt also reports weight loss during this time period. Creatine was 0.8, FSH and LH were normal, Testosterone was 340 and PSA was 1.7. He reports fluctuation of PSA from 2.29 - 1.7. Patient urinary concerns include, after laying flat, he feels urinary urgency which increases when close to destination. Pt reports sleeping 6-7 hours with nocturia x2. He reports that he recently lost his mother at the age of 97 due to fall and breaking of hip. PT also reports feeling stressed since this occurred. He feels he gets more tired during the day, taking a nap every afternoon. He has cut down on cups of coffee down to two cups daily. Patient sexual history include masturbation. He reports decreased semen volume when ejaculating which is normal as he takes tamsulosin. With tadalafil he proclaims increased arousal, sometimes easily but without it the pt has not tested the feeling.    2023: Mr. FELICE GUTIÉRREZ presents today for a follow up. He reports that he no longer has a PCP, would like a recommendation. PCP retired. Patient reports he has seen a number of different physicians this year. Saw a neurologist due to numbness in the right leg. Was found to have two lumbar discs that are arthritic. He has been doing PT on his own and his numbness improved. Pt reports a recent diagnosis of psoriasis. He admits it has been a stressful year after losing his mother. Has been using a steroid cream. His symptoms are mild at this time. In terms of his urination, he reports that he drinks about 2 cups of caffeinated coffee in the morning which causes some frequent urination in the morning hours. He did take a trip to [place] and forgot his tadalafil. He noticed that without the tadalafil 5 mg once daily, his urination was a bit worse. Stream was slower at night. In addition to tadalafil daily, he continues to take tamsulosin 0.4 mg once daily around 8 PM and alfuzosin ER 10 mg once daily in the morning. Nocturia x2 and feels he empties well. Pt has not been sexually active as of late with a partner. He does masturbate, gets a decent erection, and can reach ejaculation, however at times no fluid comes out due to tamsulosin.

## 2024-01-11 ENCOUNTER — APPOINTMENT (OUTPATIENT)
Dept: UROLOGY | Facility: CLINIC | Age: 64
End: 2024-01-11
Payer: COMMERCIAL

## 2024-01-11 DIAGNOSIS — F32.A DEPRESSION, UNSPECIFIED: ICD-10-CM

## 2024-01-11 DIAGNOSIS — N13.8 BENIGN PROSTATIC HYPERPLASIA WITH LOWER URINARY TRACT SYMPMS: ICD-10-CM

## 2024-01-11 DIAGNOSIS — R53.81 OTHER MALAISE: ICD-10-CM

## 2024-01-11 DIAGNOSIS — R79.89 OTHER SPECIFIED ABNORMAL FINDINGS OF BLOOD CHEMISTRY: ICD-10-CM

## 2024-01-11 DIAGNOSIS — R06.83 SNORING: ICD-10-CM

## 2024-01-11 DIAGNOSIS — E28.0 ESTROGEN EXCESS: ICD-10-CM

## 2024-01-11 DIAGNOSIS — R97.20 ELEVATED PROSTATE, SPECIFIC ANTIGEN [PSA]: ICD-10-CM

## 2024-01-11 DIAGNOSIS — E22.1 HYPERPROLACTINEMIA: ICD-10-CM

## 2024-01-11 DIAGNOSIS — R35.0 FREQUENCY OF MICTURITION: ICD-10-CM

## 2024-01-11 DIAGNOSIS — N52.1 ERECTILE DYSFUNCTION DUE TO DISEASES CLASSIFIED ELSEWHERE: ICD-10-CM

## 2024-01-11 DIAGNOSIS — R53.83 OTHER MALAISE: ICD-10-CM

## 2024-01-11 DIAGNOSIS — N40.1 BENIGN PROSTATIC HYPERPLASIA WITH LOWER URINARY TRACT SYMPMS: ICD-10-CM

## 2024-01-11 PROCEDURE — 99214 OFFICE O/P EST MOD 30 MIN: CPT | Mod: 95

## 2024-01-11 RX ORDER — TAMSULOSIN HYDROCHLORIDE 0.4 MG/1
0.4 CAPSULE ORAL
Qty: 90 | Refills: 3 | Status: ACTIVE | COMMUNITY
Start: 2021-11-11 | End: 1900-01-01

## 2024-01-11 RX ORDER — ALFUZOSIN HYDROCHLORIDE 10 MG/1
10 TABLET, EXTENDED RELEASE ORAL TWICE DAILY
Qty: 180 | Refills: 3 | Status: ACTIVE | COMMUNITY
Start: 2022-08-10 | End: 1900-01-01

## 2024-01-11 NOTE — ADDENDUM
[FreeTextEntry1] : This note was authored by Elida Baeza working as a scribe for Dr.Gary Leggett. I, Dr. Chris Leggett have reviewed the content of this note and confirm it is true and accurate. I personally performed the history and physical examination and made all the decisions 01/11/2024.

## 2024-01-11 NOTE — ASSESSMENT
[FreeTextEntry1] : 10/21/19: Michael Canela is a 63 y/o male patient who presents today for an initial evaluation. For the past 4-5 years he has been seeing various urologists. He began taking Tamsulosin 0.4 mg BID to improve his urination. He became concerned when the amount of ejaculate decreased and as a result the Tamsulosin was decreased to one capsule per day. Urinary frequency is reported. He states that the stream is strong. Nocturia x 2 is reported. When he wakes up at night he states that the urinary stream is weaker than it is during the day. He believes that his urination is significantly improved since beginning the Tamsulosin. He states that his sexual function is decreased, erections occur less often. He is a  and drinks at least 2 quarts of water a day. His father was diagnosed with prostate cancer in his 70's. His father also had colon cancer. His mother was diagnosed with breast cancer. He denies a Hx and FHx of kidney stones. Currently smokes less than half a pack of cigarettes a day and is attempting to quit.  2020: 59 yr male with LUTS presents dysuria , freq and recent fever and chill, resolving with abx cefalexin   10/07/2020: 59 yr male Acute LUTs possible uti , resolved with levaquin, Doxazosin to repeat ua micros PSA afte MORENA. Knee chest position was used. Digital rectal exam found no suspicious rectal masses. No rectal mucosal lesions. Anal tone is normal. The prostate is non tender, with normal texture, discrete borders, and no nodules. It is a 45 gram transurethral resection size. No gross blood on examining fingers. PVR 348ml after urination.  after repeat urination. Advised the patient to routinely double void. Patient will continue taking Tamsulosin 0.4mg BID and will call office in 2 weeks to report status of urination. RTO in 3 months for follow up.  2021: The patient presents today for a follow up. Patient appears concerned and very anxious although reports he does not usually get like this.Since his infection, he doesn't feel like he used to and reports feeling overall different which has been overwhelming. He used to wake up at night feeling an urge but now doesn't. Voids about 8-9x a day. Still has urinary frequency but denies urinary urgency. He reports drinking an adequate amount of liquids. Reports his father had prostate ca. His father had radiation seeds at 78 y/o that broke up and would cause blockages, causing his father a lot of pain, burning, and infections. Patient seems worried about going through the same that his father went through. The patient discontinued Pyridium 200 mg 1 tablet 3x a day after meals for burning. He was taking tamsulosin 0.4 mg 2x daily, however he went down to 1 after having ejaculation contraction problems. Before the pandemic he reports being sexually active. His libido is still good, however he reports that his erections are not as adequate as he'd like. He has high blood pressure and cholesterol. He works as a  for the [employer]. His last PSA was 3.79 on 10/20/2020 which is acceptable to watch. PVR is 171.  2021: Patient presents today for a telehealth visit for which he gave permission for. The patient was anxious today and apologized if he was asking questions that were detailed but he was very concerned since his father  at age 91, had pros ca and radioactive seed implants at 78 y/o and had many complications in terms of seeds entering the urethra and bladder causing pain, difficulties, and urinary symptoms. The patient said the seeds had broken up. The patient has been on tamsulosin once daily. He finds it optimal in helping some of his urinary symptoms, however he still does have a lot of symptoms. He had a UTI during my care and since then he no longer has the same sensation when he needs to urinate. He has a sense but no urge specifically at night. He finds that his stream sprays. He is going more frequently, every 2-3 hours which is bothersome to him. He reports his stream is weaker at night since the UTI. He was distressed that I wasn't ordering an MRI of the prostate for him, however I explained that I would write for one but he would not meet the insurance criteria so it most likely would not be authorized and that the current price was about 800$. Much of what he wanted an MRI for was not for detection of prostate cancer but he wanted a better understanding of anatomical problems. I agreed with him that an MRI would provide a better estimation of size and a better knowledge of the prostae than a regular digital rectal exam would, however I informed him that we can get valuable information as well from a transrectal US in terms of size and configuration. I also informed him that we can get information about configuration from cystoscopy as well. The patient was agreeable that he'd rather get the transrectal US before allotting for an MRI. His current copay for a telehealth visit is about 50$ and he informed me that he was looking not to come in or have a telehealth visit due to expenses involved since his financial means are limited at this current time. He'd be interested in an in person visit in October and would do the transrectal US at that time. I also asked him about blood work. I offered to put in the computer to do ahead of time but he was content on doing it the same day he came in. I talked about procedures to improve urination if medication management failed. I said since we are having difficulties with medical management of symptoms that we could do urodynamics and cystoscopy to better understand the anatomy and physiology relative to his difficulties. I explained the procedure in detail. Review risks in addition to the benefits. I informed him of possible risk of infection and discomfort. Reviewed procedures for improving urination by decreasing bladder outlet obstruction due to BPH. Discussed steam and water techniques We spoke about electrosurgical techniques and laser techniques for dealing with BPH resulting in bladder outlet obstruction. The patient has not been taking tadalafil ever day. He tried it only on days he wanted to have sex which he reports did help but he is not sexually active at the moment. He was paying 20$ at his local [facility], which I thought was acceptable. I advised him to check the price if he refills it as we can find him a place to charge him less if they are charging him more than 20$. He didn't want 30 pills since he is not active however I informed him it would improve his urination if taken every day. He admitted that when he was taking it sporadically it did help his urination some. I reviewed thoughts behind taking it every day. I believe his agreement to try it was sincere, placed a new prescription with refills. As noted above, I placed orders for a transrectal US to be scheduled on or around 10/6/2021. I told the patient that when he arrives on the day of, he should go first to give blood in our lab then have the US. Pt understands this.  2021: Patient presents today for prostate US. Prostate US results show normal echogenicity. Prostatic calcifications were visualized. No masses visualized. No obstruction of ejaculatory ducts. Seminal vesicles are normal size with no blood in it. +FHx of prostate cancer in father who was dx at 76y/o. Had radioactive seeds which caused pain and problems in him in that seeds broke up at as gravel. States ever since starting Tadalafil 5mg with Tamsulosin 0.4mg once daily half hour after dinner, feels he is ejaculating more. States he in the past took Tamsulosin twice daily, but was disturbed that it affected his ejaculate. States he had been celibate since the pandemic, but is interesting in returning. Since taking Tadalafil, has been ejaculating more and feels more satisfied when he masturbates. Strength of erections vary. Additionally, patient states he must urinate every 3-4 hours. Wakes 2x at night to urinate. States that since UTI he had 13 months ago, feels the urge to urinate has not been as severe but feels stream has been weaker since then. Dealing with some stress as mother is having speech problems s/p stroke and sees speech pathologist.  2021: Patient presents today for a follow up telephone visit for which he gave permission for. Currently on tadalafil 5 mg once daily and tamsulosin 0.4 mg once daily. Patient had lab work done on 2021. UA was good. Blood work demonstrated a PSA of 2.08. Estradiol level, prolactin, LH, and calcium were all normal. Creatinine was 0.9. Glucose was 106. Electrolytes and Alk phos were normal. Urine cytology was benign. Urine culture was no growth. Testosterone free was normal at 10.8, testosterone total was slightly low at 235, but with a normal free testosterone I would be reluctant to prescribe something to augment testosterone. Estrogens were elevated at 311 where the normal amount for males is within range of . The patient states that he has been feeling a little tired everyday around 3 pm where he feels like he could take a nap. On tamsulosin and tadalafil, his urination is improved.  2021: I attempted to call the patient at 6:56 PM. Patient was scheduled for 6:00 PM appointment. I left a voicemail stating that recent lab work shows that estrogen remains elevated and will consider starting Anastrazole to decrease estrogen levels or see an endocrinologist. He should reschedule telehealth appointment to discuss. I have provided the name of an endocrinologist Dr. Antonio Driscoll.  08/10/2022:  presents today for a telephone visit for which he gave permission for. He reports that he has been doing well but has been busy taking care of his mother who is 97. She is mostly lucid but memory is diminished. He shares the responsibility of caring for her with his brother. Pt expresses that he is discombobulated over all the phone calls he has been getting. The pt last saw me in 2021. He retired in 2022. In January he had to change his health plan. He had intention of returning this October and called my office yesterday morning. He was told he had an appt for 2022. After he hung up he got a phone call and VM requesting he get switched to a virtual appointment. He called and spoke to my  and informed her he needs to see me in person. Adeline told him she would call him back. She called back and informed him I would like to schedule a telehealth visit and then have him come in for a physical examination. He was confused by this. He then got a phone call from my office saying that we didn't have his health plan in our system. He now has [facility]. The patient continues taking taking tamsulosin 0.4 mg once daily and tadalafil 5 mg once daily. He feels he is urinating well. At night he gets up about 2x to void. The stream is weak at night. Erections are improved since taking tadalafil 5 mg once daily. Has not been sexually active with another partner lately. He has been exercising more frequently. He walks on average 10,000 steps a day.  I explained to the patient that I will be moving most of my work to telehealth visits. I explained the reasoning and rationale for this transition and why I prefer a telehealth visit prior to him coming in for physical examination and getting lab work prior. Pt understood. The patient would like to keep his 2022 visit. I was agreeable with him keeping his appointment so long as the week prior we have a telehealth visit. The pt understood and was agreeable. He understood the plan.  On 2021, the patient's prolactin was elevated at 20.7. I had requested that he see an endocrinologist, however he was unable to. He informs me that if his laboratory work is done through [facility], it will not cost him much. Therefore, i am sending the patient for laboratory work at [facility]. Will recheck prolactin level.  Pt will continue taking tamsulosin 0.4 mg once daily after dinner. Will continue tadalafil 5 mg once daily. I prescribed the patient Alfuzosin ER 10 mg, once daily after breakfast. I informed the patient of lightheadedness as a side effect. It was my original recommendation to increase tamsulosin to BID, however patient states he was disturbed by the decreased semen volume when he used to take tamsulosin BID.  I informed the patient that I will check with billing regarding his health insurance and if I accept it. I assured him that I or someone I trust will be calling him back about it. If I accept his insurance there will be no problems but if I don't he will get a list of approved providers and I will recommend him to whoever I think is best and send his records. If we accept his insurance, I will recommend the patient to a PCP, since he states he does not have one.  10/26/2022:  presents today for a follow up audio only telephone visit for which he gave permission for. Pt currently takes tadalafil 5 mg once daily, tamsulosin 0.4 mg once daily, and alfuzosin ER 10 mg once daily. The substitution of alfuzosin for one tamsulosin was to decrease the impact on his semen volume. He reports that his semen volume is improved. He reports that his urination has been improved by these medications. Some nights he wakes 2-3x to urinate and others he can go 4-5 hours without waking up. Certain nights his stream is powerful and other nights he finds it to be more of a trickle. Ever since he has been taking these medications, his urinary urgency has gone away. He denies any ill effects from any of the medications. He is now retired from being a . He retired in February. He now has a part time job. Since this part time job he has been taking the tamsulosin much later at night and not after a meal. Pt states that when he stands to urinate, it can get messy due to spray and deviating stream.  Reviewed and discussed laboratory work of 8/15/2022 in detail with the patient. Creatinine was 0.87. PSA was 1.7 with a percent free of 0.4.  I advised the patient to take tamsulosin 0.4 mg once daily about 15-20 minutes after a meal for better absorption. I advised him to bring a snack with him to his part time job so he can take it. Pt was agreeable. Will continue alfuzosin ER 10 mg once daily and tadalafil 5 mg once daily. Prescriptions were renewed. Advised the patient to sit when he urinates to avoid making a mess from a spray stream. I will examine urethral meatus when he comes in person. Will do a bladder scan as well. Pt inquired about medications that shrink the prostate. We discussed the possibility of finasteride and dutasteride as well as their ill effects. Pt is scheduled to see me for an in person office visit 2022 for MORENA, PE of urethral meatus, and bladder scan.  2022:  presents today for a follow up. Has been taking tamsulosin 0.4 mg once daily, alfuzosin ER 10 mg once daily and tadalafil 5 mg once daily. Has urinary frequency in the morning when he is driving but this is usually right after his morning cup of coffee. As the day goes on, his urination is fairly good. Voids about every 3-4 hours. Nocturia x1-2, however stream can be a trickle during the night. Pt is able to get erections but has not been very sexually active since the pandemic. 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 Retired from his career a few months ago.  The knee-chest position was utilized for the MORENA. Normal seminal vesicles. Anal tone is normal. Little anal skin tag at 11 o'clock. Erythema in the naval crease and above, looks like a little bit of eczema. The prostate is V shaped, wider than high, non tender, with normal texture, discrete borders, and no nodules. It is a 15 gram transurethral resection size. No rectal mucosal lesions. No gross blood on the examining finger. Pt uncomfortable with the exam but able to cooperate.  Advised patient to see a dermatologist for erythema in the naval crease. Advised to use hydrocortisone cream until then. I increased the dosage of Alfuzosin ER 10 mg from once daily to twice daily. Will continue tamsulosin 0.4 mg once daily after a meal and tadalafil 5 mg once daily. The patient produced a urine sample at [facility] which will be sent for urinalysis, urine cytology, and urine culture. Patient will have a telehealth visit in 2-3 weeks to discuss the results of his lab work and discuss increased dose of alfuzosin. Will RTO in one year for annual wellness exam.  2023  presents today for a follow up audio only telephone visit for which he gave permission for. Pt currently takes tadalafil 5 mg once daily, tamsulosin 0.4 mg once daily bedtime, and alfuzosin ER 10 mg BID. He has not noticed an improvement since taking alfuzosin BID and would prefer to take it once daily. Patient denies lightheadedness, nausea. Pt has results from past years to review. 2021 he reports elevated Estrogen at 311 done at [facility] and In 2022 it is 168. Pt also reports weight loss during this time period. Creatine was 0.8, FSH and LH were normal, Testosterone was 340 and PSA was 1.7. He reports fluctuation of PSA from 2.29 - 1.7. Patient urinary concerns include, after laying flat, he feels urinary urgency which increases when close to destination. Pt reports sleeping 6-7 hours with nocturia x2. He reports that he recently lost his mother at the age of 97 due to fall and breaking of hip. PT also reports feeling stressed since this occurred. He feels he gets more tired during the day, taking a nap every afternoon. He has cut down on cups of coffee down to two cups daily. Patient sexual history include masturbation. He reports decreased semen volume when ejaculating which is normal as he takes tamsulosin. With tadalafil he proclaims increased arousal, sometimes easily but without it the pt has not tested the feeling.  Pt denies new medication for urinary frequency and was advised to cut down on caffeine intake. Patient will continue taking tadalafil 5 mg once daily. He informs me this needs pre authorization and I will ask my nurse to assist us with this. He will also continue taking tamsulosin 0.4 mg once daily and reduce alfuzosin ER 10 mg to once daily. Pt will see new PCP Dr. Martínez located in [place]. He will also see a dermatologist. I have requested that he send me copies of notes from both. PT will RTO in 1 year, unless clinically stated.  2023: Mr. MICHAEL CANELA presents today for a follow up. He reports that he no longer has a PCP, would like a recommendation. PCP retired. Patient reports he has seen a number of different physicians this year. Saw a neurologist due to numbness in the right leg. Was found to have two lumbar discs that are arthritic. He has been doing PT on his own and his numbness improved. Pt reports a recent diagnosis of psoriasis. He admits it has been a stressful year after losing his mother. Has been using a steroid cream. His symptoms are mild at this time. In terms of his urination, he reports that he drinks about 2 cups of caffeinated coffee in the morning which causes some frequent urination in the morning hours. He did take a trip to [place] and forgot his tadalafil. He noticed that without the tadalafil 5 mg once daily, his urination was a bit worse. Stream was slower at night. In addition to tadalafil daily, he continues to take tamsulosin 0.4 mg once daily around 8 PM and alfuzosin ER 10 mg once daily in the morning. Nocturia x2 and feels he empties well. Pt has not been sexually active as of late with a partner. He does masturbate, gets a decent erection, and can reach ejaculation, however at times no fluid comes out due to tamsulosin.  The knee-chest position was utilized for the MORENA. Digital rectal exam found no suspicious rectal masses. Normal seminal vesicles. Anal tone is normal. The prostate is non tender, with normal texture, discrete borders, and no nodules. It is a 30 gram transurethral resection size. No rectal mucosal lesions. No gross blood on the examining finger. Pt will provide a urine sample and blood work at [facility]. Renewed tadalafil 5 mg once daily, tamsulosin 0.4 mg once daily, and alfuzosin ER 10 mg once daily. I recommended the patient to see Dr.Aldo Choi of primary care. He was provided with his contact information. Patient will have a telehealth visit in 2-3 weeks to review and discuss lab results.  2024: Mr. CANELA presents today for a follow up audio-visual [facility] telehealth visit for which they gave permission for. The patient was located at home [address] [place] [address] and I was located in my office in [place], [place]. The patient obtained lab work 23 prior to today's visit which he obtained from [facility].  The UA, Urine culture and Urine cytology were all normal findings. PSA % Free was low at 25 where the normal limit according to their standard is >25. PSA free was 0.4 and total was 1.6 where the upper limit is 4.0. Total testosterone was 382. Patient reports feeling well. The patient continues to take tadalafil 5 mg once daily, tamsulosin 0.4 mg once daily, and alfuzosin ER 10 mg once daily and notes urinary improvement while on it. Patient smokes 10 cigarettes a day.   Reviewed and discussed laboratory work of 23 which He obtained prior to today's visit as requested.  Patient is worried that his prostate doubled in size, I assured patient he does not have to worry. I did suggest rectal US to obtain actual size, but patient has declined and is no longer worried.   Renewed tadalafil 5 mg once daily, tamsulosin 0.4 mg once daily, and alfuzosin ER 10 mg once daily.  Advised patient to decrease smoking habit.   Pt will schedule a telehealth visit in 1 year.   Preparation, audio-visual visit, and coordination of care took 30 Minutes.

## 2024-01-11 NOTE — HISTORY OF PRESENT ILLNESS
[FreeTextEntry1] : 10/21/19: Felice Gutiérrez is a 61 y/o male patient who presents today for an initial evaluation. For the past 4-5 years he has been seeing various urologists. He began taking Tamsulosin 0.4 mg BID to improve his urination. He became concerned when the amount of ejaculate decreased and as a result the Tamsulosin was decreased to one capsule per day. Urinary frequency is reported. He states that the stream is strong. Nocturia x 2 is reported. When he wakes up at night he states that the urinary stream is weaker than it is during the day. He believes that his urination is significantly improved since beginning the Tamsulosin. He states that his sexual function is decreased, erections occur less often. He is a  and drinks at least 2 quarts of water a day. His father was diagnosed with prostate cancer in his 70's. His father also had colon cancer. His mother was diagnosed with breast cancer. He denies a Hx and FHx of kidney stones. Currently smokes less than half a pack of cigarettes a day and is attempting to quit.   2020: 59 yr male history of LUTS for 6 yrs going to urology. Present buring , freq, fever , pain at tip of penis, supra pubic pressure started one week. Fever resolved with abx cefalexin from urgent care. Freq and buring is resolving. Freq down to 2 hrly from q 45 min. Anxious .  . PSA 1.58 as at 10/19   10/7/2020: 59 yr male with acute  LUTs symptoms concerning for UTI ,now resolved after Levofloxacin 750mg take for 7 days and feels well today.  Doxazosin. Takes Tamsulosin 0.4mg BID.  Said pressure and burning and pain tip of penis resolved.  Wakes 2X nocte to pee.  Freq during the day .  Feels like do not empty bladder and feels urgency in the morning hours, but stream is good and no leakage. Feels increased urgency when driving. Pt concerned about low testosterone. Erections are adequate.  UA/Cx  show now bacterial growth.  Unremarkable  PSA  1.58 oct 2019  +FHx of bladder stone in father.   2021: The patient presents today for a follow up. Patient appears concerned and very anxious although reports he does not usually get like this.Since his infection, he doesn't feel like he used to and reports feeling overall different which has been overwhelming. He used to wake up at night feeling an urge but now doesn't. Voids about 8-9x a day. Still has urinary frequency but denies urinary urgency. He reports drinking an adequate amount of liquids. Reports his father had prostate ca. His father had radiation seeds at 76 y/o that broke up and would cause blockages, causing his father a lot of pain, burning, and infections. Patient seems worried about going through the same that his father went through. The patient discontinued Pyridium 200 mg 1 tablet 3x a day after meals for burning. He was taking tamsulosin 0.4 mg 2x daily, however he went down to 1 after having ejaculation contraction problems. Before the pandemic he reports being sexually active. His libido is still good, however he reports that his erections are not as adequate as he'd like. He has high blood pressure and cholesterol. He works as a  for the [employer]. His last PSA was 3.79 on 10/20/2020 which is acceptable to watch. PVR is 171.   2021: Patient was not seen in office today.   2021: Patient presents today for a telehealth visit for which he gave permission for. The patient was anxious today and apologized if he was asking questions that were detailed but he was very concerned since his father  at age 91, had pros ca and radioactive seed implants at 76 y/o and had many complications in terms of seeds entering the urethra and bladder causing pain, difficulties, and urinary symptoms. The patient said the seeds had broken up. The patient has been on tamsulosin once daily. He finds it optimal in helping some of his urinary symptoms, however he still does have a lot of symptoms. He had a UTI during my care and since then he no longer has the same sensation when he needs to urinate. He has a sense but no urge specifically at night. He finds that his stream sprays. He is going more frequently, every 2-3 hours which is bothersome to him. He reports his stream is weaker at night since the UTI. He was distressed that I wasn't ordering an MRI of the prostate for him, however I explained that I would write for one but he would not meet the insurance criteria so it most likely would not be authorized and that the current price was about 800$. Much of what he wanted an MRI for was not for detection of prostate cancer but he wanted a better understanding of anatomical problems. I agreed with him that an MRI would provide a better estimation of size and a better knowledge of the prostae than a regular digital rectal exam would, however I informed him that we can get valuable information as well from a transrectal US in  terms of size and configuration. I also informed him that we can get information about configuration from cystoscopy as well . The patient was agreeable that he'd rather get the transrectal US before allotting for an MRI. His current copay for a telehealth visit is about 50$ and he informed me that he was looking not to come in or have a telehealth visit due to expenses involved since his financial means are limited at this current time. He'd be interested in an in person visit in October and would do the transrectal US at that time. I also asked him about blood work. I offered to put in the computer to do ahead of time but he was content on doing it the same day he came in. I talked about procedures to improve urination if medication management failed. I said since we are having difficulties with medical management of symptoms that we could do urodynamics and cystoscopy to better understand the anatomy and physiology relative to his difficulties. I explained the procedure in detail. Review risks in addition to the benefits. I informed him of possible risk of infection and discomfort. Reviewed procedures for improving urination by decreasing bladder outlet obstruction due to BPH. Discussed steam and water techniques  We spoke about electrosurgical techniques and laser techniques for dealing with BPH resulting in bladder outlet obstruction. The patient has not been taking tadalafil ever day. He tried it only on days he wanted to have sex which he reports did help but he is not sexually active at the moment. He was paying 20$ at his local [facility], which I thought was acceptable. I advised him to check the price if he refills it as we can find him a place to charge him less if they are charging him more than 20$. He didn't want 30 pills since he is not active however I informed him it would improve his urination if taken every day. He admitted that when he was taking it sporadically it did help his urination some. I reviewed thoughts behind taking it every day. I believe his agreement to try it was sincere, placed a new prescription with refills. As noted above, I placed orders for a transrectal US to be scheduled on or around 10/6/2021. I told the patient that when he arrives on the day of, he should go first to give blood in our lab then have the US. Pt understands this.   2021: Patient presents today for prostate US. Prostate US results show normal echogenicity. Prostatic calcifications were visualized. No masses visualized. No obstruction of ejaculatory ducts. Seminal vesicles are normal size with no blood in it. +FHx of prostate cancer in father who was dx at 78y/o. Had radioactive seeds which caused pain and problems in him in that seeds broke up at as gravel. States ever since starting Tadalafil 5mg with Tamsulosin 0.4mg once daily half hour after dinner, feels he is ejaculating more. States he in the past took Tamsulosin twice daily, but was disturbed that it affected his ejaculate. States he had been celibate since the pandemic, but is interesting in returning. Since taking Tadalafil, has been ejaculating more and feels more satisfied when he masturbates. Strength of erections vary. Additionally, patient states he must urinate every 3-4 hours. Wakes 2x at night to urinate. States that since UTI he had 13 months ago, feels the urge to urinate has not been as severe. Dealing with some stress as mother is having speech problems and sees speech pathologist.   2021: Patient presents today for a follow up telephone visit for which he gave permission for. Currently on tadalafil 5 mg once daily and tamsulosin 0.4 mg once daily. Patient had lab work done on 2021. UA was good. Blood work demonstrated a PSA of 2.08. Estradiol level, prolactin, LH, and calcium were all normal. Creatinine was 0.9. Glucose was 106. Electrolytes and Alk phos were normal. Urine cytology was benign. Urine culture was no growth. Testosterone free was normal at 10.8, testosterone total was slightly low at 235, but with a normal free testosterone I would be reluctant to prescribe something to augment testosterone. Estrogens were elevated at 311 where the normal amount for males is within range of . The patient states that he has been feeling a little tired everyday around 3 pm where he feels like he could take a nap. On tamsulosin and tadalafil, his urination is improved.   2021: I attempted to call the patient at 6:56 PM. Patient was scheduled for 6:00 PM appointment. I left a voicemail stating that recent lab work shows that estrogen remains elevated and will consider starting Anastrazole to decrease estrogen levels or see an endocrinologist. He should reschedule telehealth appointment to discuss. I have provided the name of an endocrinologist Dr. Antonio Driscoll.   08/10/2022:  presents today for a telephone visit for which he gave permission for. He reports that he has been doing well but has been busy taking care of his mother who is 97. She is mostly lucid but memory is diminished. He shares the responsibility of caring for her with his brother. Pt expresses that he is discombobulated over all the phone calls he has been getting. The pt last saw me in 2021. He retired in 2022. In January he had to change his health plan. He had intention of returning this October and called my office yesterday morning. He was told he had an appt for 2022. After he hung up he got a phone call and VM requesting he get switched to a virtual appointment. He called and spoke to my  and informed her he needs to see me in person. Adeline told him she would call him back. She called back and informed him I would like to schedule a telehealth visit and then have him come in for a physical examination. He was confused by this. He then got a phone call from my office saying that we didn't have his health plan in our system. He now has [employer]. The patient continues taking taking tamsulosin 0.4 mg once daily and tadalafil 5 mg once daily. He feels he is urinating well. At night he gets up about 2x to void. The stream is weak at night. Erections are improved since taking tadalafil 5 mg once daily. Has not been sexually active with another partner lately. He has been exercising more frequently. He walks on average 10,000 steps a day.   10/26/2022:  presents today for a follow up audio only telephone visit for which he gave permission for. Pt currently takes tadalafil 5 mg once daily, tamsulosin 0.4 mg once daily, and alfuzosin ER 10 mg once daily. The substitution of alfuzosin for one tamsulosin was to decrease the impact on his semen volume. He reports that his semen volume is improved. He reports that his urination has been improved by these medications. Some nights he wakes 2-3x to urinate and others he can go 4-5 hours without waking up. Certain nights his stream is powerful and other nights he finds it to be more of a trickle. Ever since he has been taking these medications, his urinary urgency has gone away. He denies any ill effects from any of the medications. He is now retired from being a . He retired in February. He now has a part time job. Since this part time job he has been taking the tamsulosin much later at night and not after a meal. Pt states that when he stands to urinate, it can get messy due to spray and deviating stream.   2022:  presents today for a follow up. Has been taking tamsulosin 0.4 mg once daily, alfuzosin ER 10 mg once daily and tadalafil 5 mg once daily. Has urinary frequency in the morning when he is driving but this is usually right after his morning cup of coffee. As the day goes on, his urination is fairly good. Voids about every 3-4 hours. Nocturia x1-2, however stream can be a trickle during the night. Pt is able to get erections but has not been very sexually active since the pandemic. 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 Retired from his career a few months ago.   2023  presents today for a follow up audio only telephone visit for which he gave permission for.  Pt currently takes tadalafil 5 mg once daily, tamsulosin 0.4 mg once daily bedtime, and alfuzosin ER 10 mg BID. He has not noticed an improvement since taking alfuzosin BID and would prefer to take it once daily. Patient denies lightheadedness, nausea. Pt has results from past years to review. 2021 he reports elevated Estrogen at 311 done at [facility] and In 2022 it is 168. Pt also reports weight loss during this time period. Creatine was 0.8, FSH and LH were normal, Testosterone was 340 and PSA was 1.7. He reports fluctuation of PSA from 2.29 - 1.7. Patient urinary concerns include, after laying flat, he feels urinary urgency which increases when close to destination. Pt reports sleeping 6-7 hours with nocturia x2. He reports that he recently lost his mother at the age of 97 due to fall and breaking of hip. PT also reports feeling stressed since this occurred. He feels he gets more tired during the day, taking a nap every afternoon. He has cut down on cups of coffee down to two cups daily. Patient sexual history include masturbation. He reports decreased semen volume when ejaculating which is normal as he takes tamsulosin. With tadalafil he proclaims increased arousal, sometimes easily but without it the pt has not tested the feeling.    2023: Mr. FELICE GUTIÉRREZ presents today for a follow up. He reports that he no longer has a PCP, would like a recommendation. PCP retired. Patient reports he has seen a number of different physicians this year. Saw a neurologist due to numbness in the right leg. Was found to have two lumbar discs that are arthritic. He has been doing PT on his own and his numbness improved. Pt reports a recent diagnosis of psoriasis. He admits it has been a stressful year after losing his mother. Has been using a steroid cream. His symptoms are mild at this time. In terms of his urination, he reports that he drinks about 2 cups of caffeinated coffee in the morning which causes some frequent urination in the morning hours. He did take a trip to [place] and forgot his tadalafil. He noticed that without the tadalafil 5 mg once daily, his urination was a bit worse. Stream was slower at night. In addition to tadalafil daily, he continues to take tamsulosin 0.4 mg once daily around 8 PM and alfuzosin ER 10 mg once daily in the morning. Nocturia x2 and feels he empties well. Pt has not been sexually active as of late with a partner. He does masturbate, gets a decent erection, and can reach ejaculation, however at times no fluid comes out due to tamsulosin.   2024: Mr. GUTIÉRREZ presents today for a follow up audio-visual Facetime telehealth visit for which they gave permission for. The patient was located at home [address] [place] [address] and I was located in my office in [place], [place]. The patient obtained lab work 23 prior to today's visit which he obtained from [facility].  The UA, Urine culture and Urine cytology were all normal findings. PSA % Free was low at 25 where the normal limit according to their standard is >25. PSA free was 0.4 and total was 1.6 where the upper limit is 4.0. Total testosterone was 382. Patient reports feeling well. The patient continues to take tadalafil 5 mg once daily, tamsulosin 0.4 mg once daily, and alfuzosin ER 10 mg once daily and notes urinary improvement while on it. Patient smokes 10 cigarettes a day.

## 2024-01-11 NOTE — PHYSICAL EXAM
[Normal Appearance] : normal appearance [Well Groomed] : well groomed [General Appearance - In No Acute Distress] : no acute distress [Edema] : no peripheral edema [Respiration, Rhythm And Depth] : normal respiratory rhythm and effort [Exaggerated Use Of Accessory Muscles For Inspiration] : no accessory muscle use [] : no rash [Oriented To Time, Place, And Person] : oriented to person, place, and time [Affect] : the affect was normal [Mood] : the mood was normal [de-identified] : The knee-chest position was utilized for the MORENA. Digital rectal exam found no suspicious rectal masses. Normal seminal vesicles. Anal tone is normal. The prostate is non tender, with normal texture, discrete borders, and no nodules. It is a 30 gram transurethral resection size. No rectal mucosal lesions. No gross blood on the examining finger.

## 2024-01-30 ENCOUNTER — RX RENEWAL (OUTPATIENT)
Age: 64
End: 2024-01-30

## 2024-01-30 RX ORDER — TADALAFIL 5 MG/1
5 TABLET ORAL
Qty: 90 | Refills: 3 | Status: ACTIVE | COMMUNITY
Start: 2021-02-09 | End: 1900-01-01

## 2024-02-06 ENCOUNTER — APPOINTMENT (OUTPATIENT)
Dept: INTERNAL MEDICINE | Facility: CLINIC | Age: 64
End: 2024-02-06
Payer: COMMERCIAL

## 2024-02-06 ENCOUNTER — NON-APPOINTMENT (OUTPATIENT)
Age: 64
End: 2024-02-06

## 2024-02-06 VITALS
WEIGHT: 241 LBS | HEIGHT: 72 IN | DIASTOLIC BLOOD PRESSURE: 78 MMHG | HEART RATE: 88 BPM | SYSTOLIC BLOOD PRESSURE: 131 MMHG | BODY MASS INDEX: 32.64 KG/M2

## 2024-02-06 DIAGNOSIS — Z86.39 PERSONAL HISTORY OF OTHER ENDOCRINE, NUTRITIONAL AND METABOLIC DISEASE: ICD-10-CM

## 2024-02-06 DIAGNOSIS — L40.9 PSORIASIS, UNSPECIFIED: ICD-10-CM

## 2024-02-06 DIAGNOSIS — F17.210 NICOTINE DEPENDENCE, CIGARETTES, UNCOMPLICATED: ICD-10-CM

## 2024-02-06 DIAGNOSIS — Z00.00 ENCOUNTER FOR GENERAL ADULT MEDICAL EXAMINATION W/OUT ABNORMAL FINDINGS: ICD-10-CM

## 2024-02-06 DIAGNOSIS — R23.1 PALLOR: ICD-10-CM

## 2024-02-06 DIAGNOSIS — I10 ESSENTIAL (PRIMARY) HYPERTENSION: ICD-10-CM

## 2024-02-06 PROCEDURE — 36415 COLL VENOUS BLD VENIPUNCTURE: CPT

## 2024-02-06 PROCEDURE — 99396 PREV VISIT EST AGE 40-64: CPT

## 2024-02-06 PROCEDURE — 93000 ELECTROCARDIOGRAM COMPLETE: CPT

## 2024-02-06 PROCEDURE — G2211 COMPLEX E/M VISIT ADD ON: CPT | Mod: NC,1L

## 2024-02-06 RX ORDER — LOSARTAN POTASSIUM 50 MG/1
50 TABLET, FILM COATED ORAL DAILY
Qty: 90 | Refills: 3 | Status: ACTIVE | COMMUNITY
Start: 2023-01-12 | End: 1900-01-01

## 2024-02-06 RX ORDER — ATORVASTATIN CALCIUM 20 MG/1
20 TABLET, FILM COATED ORAL
Qty: 90 | Refills: 3 | Status: ACTIVE | COMMUNITY
Start: 2023-01-12 | End: 1900-01-01

## 2024-02-06 NOTE — HISTORY OF PRESENT ILLNESS
[de-identified] : 63 year old male presents for initial annual exam.    +smoker, 1/2 PPD. Ct chest lung- small pulm nodules.  follows with pulm.    colonoscopy in 2022.  one benign polyp. done by Dr. Calvert.   h/o vocal chord nodule.  being followed by ENT.  no intervention needed at this time.  seems high strung at times.  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

## 2024-02-06 NOTE — HEALTH RISK ASSESSMENT
[Fair] : ~his/her~ current health as fair  [Good] : ~his/her~  mood as  good [No falls in past year] : Patient reported no falls in the past year [0] : 2) Feeling down, depressed, or hopeless: Not at all (0) [PHQ-2 Negative - No further assessment needed] : PHQ-2 Negative - No further assessment needed [Patient reported colonoscopy was normal] : Patient reported colonoscopy was normal [Current] : Current [20 or more] : 20 or more [[id number]] : 0 [ColonoscopyDate] : 2022

## 2024-02-06 NOTE — ASSESSMENT
[FreeTextEntry1] : //  gets labs drawn at [facility]   Hypertension, controlled  -cont losartan as directed  -educated that increased blood pressure is linked to but limited to increase risk of heart disease, stroke, kidney failure and even death. stressed the importance to lower values by complying to a less than 2g sodium diet, moderate cardiovascular exercise and decrease stress level as recommended by the SPRINT Trial  -advised to check blood pressure at home with blood pressure cuff at different times of the day and bring to next appt, to eliminate possibility of white coat syndrome.  Hyperlipidemia  -Will check labs  -Advised decrease greasy, fatty foods, increase exercise, fiber intake  -Elevated cholesterol has been linked to increase in cardiovascular even such as heart attack, stroke, peripheral artery disease and death  Pulm nodules, follows with pulm  -annual LD CT chest   Nicotine Dependence, smokes 1/2 PPD  -discussed multiple options to quit smoking such as nicotine replacements, medications and group therapy, advised to pick a quit date and strive to achieve complete cessation, informational phone numbers, programs and educational materiel given to patient, stressed importance to quit as to reduce risk of cancer, cardiovascular event and premature death.   -Patient is a smoker, smoking cessation was strongly encouraged.  Patient was advised that smoking cessation lowers risks for lung and other types of cancer, reduces the risk of coronary heart disease, stroke and peripheral vascular disease and reduces the risk of developing chronic obstructive pulmonary disease (COPD).  Overall, smoking can cause serious health issues and death.   -It is important that you stop smoking.  Advised to pick a quit date and adhere to therapy.  If you need help to quit smoking call the [facility] Smokers Quitline at 1-968.512.6133 for additional information and nicotine replacement therapy   time spent on education 5 minutes.  CVD, CT coranary angio in 2023- nonobstructive epicardial CAD, calcium score-39.  decresaed DP pulses bilaterally  -check US art LE  -check carotid doppler   BPH, follows with urology  -cont tadalafil 5 mg once daily, tamsulosin 0.4 mg once daily, and alfuzosin ER 10 mg once daily. -f/u as per uro  Healthcare Maintenance -Advise Yearly Skin cancer screening with Dermatologist  -Advise Yearly Eye exam with Ophthalmologist -Advise Yearly Dental exam -Educated of the importance of Healthy diet, such as Mediterranean Diet and Exercise, such as walking >20 minutes a day and increasing gradually as tolerated  Immunizations -Flu vaccine -up to date  -Covid vaccine  -Pneumonia vaccine -up to date  -Discussed shingles vaccine (shingrix), advised to verify with insurance if its covered through medical or prescription plan  Preventative screening  -advised to get colonoscopy for colon cancer screening -up to date  -will check PSA for prostate cancer screening -labs drawn   f/u in 3 months

## 2024-02-06 NOTE — PHYSICAL EXAM
Spoke with mom missed appt today requesting refill for adderall instructed another appt for med check.   [Normal] : normal rate, regular rhythm, normal S1 and S2 and no murmur heard [Pedal Pulses Present] : the pedal pulses are present [No Edema] : there was no peripheral edema [No Extremity Clubbing/Cyanosis] : no extremity clubbing/cyanosis [Soft] : abdomen soft [Non Tender] : non-tender [Non-distended] : non-distended [No HSM] : no HSM [Normal Posterior Cervical Nodes] : no posterior cervical lymphadenopathy [Normal Anterior Cervical Nodes] : no anterior cervical lymphadenopathy [No CVA Tenderness] : no CVA  tenderness [No Joint Swelling] : no joint swelling [No Rash] : no rash [No Focal Deficits] : no focal deficits [Normal Affect] : the affect was normal [Normal Mood] : the mood was normal

## 2024-02-12 ENCOUNTER — APPOINTMENT (OUTPATIENT)
Dept: PULMONOLOGY | Facility: CLINIC | Age: 64
End: 2024-02-12

## 2024-02-15 ENCOUNTER — NON-APPOINTMENT (OUTPATIENT)
Age: 64
End: 2024-02-15

## 2024-02-15 VITALS — WEIGHT: 241 LBS | BODY MASS INDEX: 32.64 KG/M2 | HEIGHT: 72 IN

## 2024-02-15 DIAGNOSIS — F17.200 NICOTINE DEPENDENCE, UNSPECIFIED, UNCOMPLICATED: ICD-10-CM

## 2024-02-15 NOTE — HISTORY OF PRESENT ILLNESS
[Current] : Current [TextBox_13] : Patient is scheduled for a annual  LDCT for lung cancer screening. Chart review performed to confirm eligibility for LDCT.    No documented personal or family history of lung cancer. No documented s/s of lung cancer. Patient is a current smoker with a 30 pack year hx. [PacksperYear] : 30

## 2024-02-20 ENCOUNTER — OUTPATIENT (OUTPATIENT)
Dept: OUTPATIENT SERVICES | Facility: HOSPITAL | Age: 64
LOS: 1 days | End: 2024-02-20
Payer: COMMERCIAL

## 2024-02-20 ENCOUNTER — APPOINTMENT (OUTPATIENT)
Dept: CT IMAGING | Facility: IMAGING CENTER | Age: 64
End: 2024-02-20
Payer: COMMERCIAL

## 2024-02-20 DIAGNOSIS — Z00.8 ENCOUNTER FOR OTHER GENERAL EXAMINATION: ICD-10-CM

## 2024-02-20 PROCEDURE — 71271 CT THORAX LUNG CANCER SCR C-: CPT

## 2024-02-20 PROCEDURE — 71271 CT THORAX LUNG CANCER SCR C-: CPT | Mod: 26

## 2024-02-25 NOTE — ADDENDUM
[FreeTextEntry1] : Addendum (Delmis; 2/25/24): Mr. Caneal's annual chest CT is unchanged; I will review it with him at our upcoming visit in 3/2024.  IMPRESSION: Unchanged small lung nodules. Lung RADS 2: Benign based on imaging features are indolent behavior. Recommend low dose screening chest CT in one year.

## 2024-02-25 NOTE — ASSESSMENT
[FreeTextEntry1] : Labs: 12/2022 Na 140, K 4.1, Cl 105, HCO3 24, BUN/creat 17/0.8, Ca 9.4 Alk phos 72, Albumin 4.2   Chest CT (1/2023): IMPRESSION: Stable small nodule in the right upper lobe when compared to previous exam. Lung RADS 2: Category benign appearance Continue screening with low-dose CT scan of the chest in one year.  PFTs: 4/2023 FEV1/FVC 76% FEV1 3.74, 109% FVC 4.89, 107% TLC 7.82, 116% RV 2.46, 112% DLCO 23.3, 84%  6MWT 4/2023: 6MWT distance 441 meters (1447 feet); ishmael SpO2 97%.  COPD Assessment test 6/5/23: 8  ESS:  12/5/23: 5  A/P:  63 yo M h/o obesity, tobacco use, snoring, and mild emphysema returns to clinic.  Mr. Canela feels well today. He has a low symptom burden and no exacerbations. He is getting some benefit from the Albuterol. I suspect his sleep is slightly improved due to his weight loss. He continues to smoke cigarettes; he is in the contemplation phase of cessation. He is agreeable to the PCV-20.  For his snoring, he is interested in obtaining another sleep study. We discussed the need to re-try CPAP; he is unsure if he will be able to tolerate it, but he is willing to try.  1. Mild emphysema 2. Tobacco smoking 3. Snoring 4. Obesity 5. RUL pulmonary nodule, 3 mm 6. Grief symptoms after his mother's death  -congratulated on regular physical activity and weight loss -discussed the benefits of tobacco cessation; he has NRT at home -A1AT declined -Sleep study referral -LCS: chest CT in 1/2024; request navigation team to help arrange the CT as well as establishing a primary care physician -Vaccinations: PCV-20 12/5/23, Influenza 11/2023 -follow-up with me in 3 months

## 2024-02-25 NOTE — CONSULT LETTER
[FreeTextEntry1] : Dr. Wiley- I saw Mr. Canela today. He is feeling well. He has found a support group that is helping with his grief. He has not pursued the sleep study since he has tried CPAP in the past and had difficulty tolerating it. PRN Albuterol is helping. We discussed regular physical activity, smoking cessation, and weight loss. I will see him back in 6 months. \par  \par  Please call me with additional thoughts.\par  \par  Tee Benites\par  C: [phone] [FreeTextEntry2] : Jonah Wiley [FreeTextEntry3] : Tee Benites

## 2024-02-25 NOTE — PROCEDURE
[FreeTextEntry1] : Mr. Canela previously reviewed the PCV-15 and PCV-20 handout. We discussed side effects (arm soreness, fever, chills) and contraindications (prior difficulty with vaccinations, h/o neurologic conditions). He has tolerated prior vaccinations well.  I prepped the skin of the L deltoid with alcohol and administered the PCV-20 vaccine; a bandage was applied. There were no immediate complications.

## 2024-02-25 NOTE — HISTORY OF PRESENT ILLNESS
[TextBox_11] : 1/2 [TextBox_4] : Mr. Michael Canela returned to clinic today. In review, Mr. Canela is a 64 yo M h/o BPH, prostatitis, HLD, obesity, and tobacco use c/b by mild emphysema. Mr. Canela established care with Dr. Wiley in . I first saw Mr. Canela in 2023 when he had questions about lung function, wheezing, pulmonary nodules, sleep studies, and tobacco cessation. Our evaluation has included PFTs and chest CT. Treatment has included discussions about tobacco cessation, physical activity, and weight loss as well as a trial of Albuterol.  I last saw Mr. Canela in 2023 when we re-discussed tobacco smoking cessation and physical activity as a mechanism to achieve weight loss. I recommended a sleep study and PCV-20; he wanted to consider both.  Today, Mr. Canela reports feeling well. Since our last visit, he has not had respiratory exacerbations requiring ER visits, hospitalizations, nor antibiotics/steroids. He has lost 7 pounds via walking 1 mile/day. In the course of increasing his physical activity, he developed some R leg numbness that prompted a orthopedic surgery evaluation (Dr. Aggarwal), for which he is undergoing physical therapy. He has undergone an MRI of his back revealing some stenosis and possible sciatica. Mr. Canela reports his breathing is "fine." He is using Albuterol in the evenings. He continues to smoke cigarettes at 1/2 pack/day. He endorses snoring, daytime fatigue, and several naps/week. He is not falling asleep uncontrollably.  Mr. Canela reports undergoing a home sleep study in the past. He thinks the result was "borderline" for ENOC. He tried CPAP but wasn't able to tolerate it. He does not think he would want to try CPAP again. He is interested in treatment, including medications or a nerve stimulator.  PMH: Prostatitis BPH HLD Laryngopharyngeal reflux disease Tobacco use Obesity Emphysema  PSH:  Bilateral knee surgery, meniscus repair  FH: Mom  of heart disease at age 97; h/o CKD ( in 2022) Dad with CVA and carotid artery stenosis Grandfather with DM Grandmother with bone cancer  SH: Mr. Canela was born in [place] and lives in [place]. He is a retired ; he worked for 30 years. He smoked cigarettes at 1/2 pack/day between the ages of [age] (20 pack-years). He drinks alcohol occasionally. He has used marijuana and cocaine in the remote past.   He endorses exposure to fumes and birds in prior work. He used to work for a [employer] company. He denies exposure to Asbestos, Tuberculosis, mold, dust, smoke, heavy metals, Beryllium, or hot tubs. [TextBox_13] : 40

## 2024-02-26 ENCOUNTER — NON-APPOINTMENT (OUTPATIENT)
Age: 64
End: 2024-02-26

## 2024-02-27 ENCOUNTER — TRANSCRIPTION ENCOUNTER (OUTPATIENT)
Age: 64
End: 2024-02-27

## 2024-02-28 ENCOUNTER — APPOINTMENT (OUTPATIENT)
Dept: ULTRASOUND IMAGING | Facility: IMAGING CENTER | Age: 64
End: 2024-02-28
Payer: COMMERCIAL

## 2024-02-28 ENCOUNTER — OUTPATIENT (OUTPATIENT)
Dept: OUTPATIENT SERVICES | Facility: HOSPITAL | Age: 64
LOS: 1 days | End: 2024-02-28
Payer: COMMERCIAL

## 2024-02-28 DIAGNOSIS — F17.210 NICOTINE DEPENDENCE, CIGARETTES, UNCOMPLICATED: ICD-10-CM

## 2024-02-28 PROCEDURE — 93925 LOWER EXTREMITY STUDY: CPT | Mod: 26

## 2024-02-28 PROCEDURE — 93925 LOWER EXTREMITY STUDY: CPT

## 2024-02-28 PROCEDURE — 93880 EXTRACRANIAL BILAT STUDY: CPT

## 2024-02-28 PROCEDURE — 93880 EXTRACRANIAL BILAT STUDY: CPT | Mod: 26

## 2024-03-01 ENCOUNTER — CLINICAL ADVICE (OUTPATIENT)
Age: 64
End: 2024-03-01

## 2024-03-07 ENCOUNTER — APPOINTMENT (OUTPATIENT)
Dept: SLEEP CENTER | Facility: CLINIC | Age: 64
End: 2024-03-07

## 2024-03-15 ENCOUNTER — APPOINTMENT (OUTPATIENT)
Dept: CARDIOLOGY | Facility: CLINIC | Age: 64
End: 2024-03-15

## 2024-03-18 ENCOUNTER — APPOINTMENT (OUTPATIENT)
Dept: PULMONOLOGY | Facility: CLINIC | Age: 64
End: 2024-03-18
Payer: COMMERCIAL

## 2024-03-18 VITALS
DIASTOLIC BLOOD PRESSURE: 77 MMHG | TEMPERATURE: 97.2 F | RESPIRATION RATE: 16 BRPM | WEIGHT: 248.5 LBS | HEIGHT: 72 IN | OXYGEN SATURATION: 97 % | SYSTOLIC BLOOD PRESSURE: 148 MMHG | HEART RATE: 75 BPM | BODY MASS INDEX: 33.66 KG/M2

## 2024-03-18 PROCEDURE — 99213 OFFICE O/P EST LOW 20 MIN: CPT

## 2024-03-18 NOTE — PHYSICAL EXAM
[No Acute Distress] : no acute distress [Normal S1, S2] : normal s1, s2 [Normal Appearance] : normal appearance [Normal Rate/Rhythm] : normal rate/rhythm [No Murmurs] : no murmurs [No Resp Distress] : no resp distress [Clear to Auscultation Bilaterally] : clear to auscultation bilaterally [No Abnormalities] : no abnormalities [Benign] : benign [No Cyanosis] : no cyanosis [No Clubbing] : no clubbing [Normal Gait] : normal gait [Normal Color/ Pigmentation] : normal color/ pigmentation [No Edema] : no edema [FROM] : FROM [Oriented x3] : oriented x3 [No Focal Deficits] : no focal deficits [TextBox_11] : NC/AT [Normal Affect] : normal affect

## 2024-03-18 NOTE — HISTORY OF PRESENT ILLNESS
[Current] : current [>= 20 pack years] : >= 20 pack years [TextBox_4] : Mr. Michael Canela returned to clinic today. In review, Mr. Canela is a 62 yo M h/o BPH, prostatitis, HLD, obesity, and tobacco use c/b by mild emphysema. Mr. Canela established care with Dr. Wiley in . I first saw Mr. Canela in 2023 when he had questions about lung function, wheezing, pulmonary nodules, sleep study, and tobacco cessation. Our evaluation has included PFTs and chest CT. Treatment has included discussions about tobacco cessation, physical activity, and weight loss as well as a trial of Albuterol. I recommended a sleep study.  I last saw Mr. Canela in 2023 when we re-discussed tobacco smoking cessation, physical activity as a mechanism to achieve weight loss, and a sleep study.   Today, Mr. Canela reports feeling well. He has not had respiratory exacerbations requiring antibiotics/steroids, ER evaluations, nor hospitalization. He is using his Albuterol intermittently at night with benefit. He has tried to cut down smoking, though he remains at 1/2 pack/day. His weight is higher than prior; he has not been as active as prior. He is trying to walk regularly.  For his sleep, he did not pursue the sleep study. He reports having a prior sleep study showing sleep apnea, though he was not able to tolerate CPAP.   PMH: Prostatitis BPH HLD Laryngopharyngeal reflux disease Tobacco use Obesity Emphysema  PSH:  Bilateral knee surgery, meniscus repair  FH: Mom  of heart disease at age 97; h/o CKD ( in 2022) Dad with CVA and carotid artery stenosis Grandfather with DM Grandmother with bone cancer  SH: Mr. Canela was born in [place] and lives in [place]. He is a retired ; he worked for 30 years. He smoked cigarettes at 1/2 pack/day between the ages of [age] (20 pack-years). He drinks alcohol occasionally. He has used marijuana and cocaine in the remote past.   He endorses exposure to fumes and birds in prior work. 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 He denies exposure to Asbestos, Tuberculosis, mold, dust, smoke, heavy metals, Beryllium, or hot tubs. [TextBox_11] : 1/2 [TextBox_13] : 40

## 2024-03-18 NOTE — REASON FOR VISIT
[Follow-Up] : a follow-up visit [Emphysema] : emphysema [Nicotine Dependence] : nicotine dependence [TextBox_44] : Lung cancer screening

## 2024-03-18 NOTE — ASSESSMENT
[FreeTextEntry1] : Labs: 12/2022 Na 140, K 4.1, Cl 105, HCO3 24, BUN/creat 17/0.8, Ca 9.4 Alk phos 72, Albumin 4.2  Chest CT (1/2023): IMPRESSION: Stable small nodule in the right upper lobe when compared to previous exam. Lung RADS 2: Category benign appearance Continue screening with low-dose CT scan of the chest in one year.  Chest CT (1/2024): IMPRESSION: Unchanged small lung nodules. Lung RADS 2: Benign based on imaging features are indolent behavior. Recommend low dose screening chest CT in one year.  PFTs:            4/2023 FEV1/FVC     76% FEV1              3.74, 109% FVC               4.89, 107% TLC               7.82, 116% RV                  2.46, 112% DLCO            23.3, 84%  6MWT 4/2023: 6MWT distance 441 meters (1447 feet); ishmael SpO2 97%.  COPD Assessment test 6/5/23: 8  ESS:  12/5/23: 5  A/P: 62 yo M h/o obesity c/b snoring, tobacco use, and mild emphysema returns to clinic.  Mr. Canela continues to have a low symptom burden without exacerbations. We discussed opportunities to improve his health, including tobacco smoking cessation, increased physical activity, a long-acting inhaler trial, and weight loss. He remains in the contemplation phase of smoking cessation. His goal is to lose weight via increased physical activity.  For his sleep study, he ultimately did not pursue repeat PSG since he does not want to re-try CPAP. We discussed non-CPAP options for ENOC, management, including dental devices, medications, and cranial nerve stimulators. I recommended obtaining the sleep study as well as a sleep consult. He declined; he prefers to try and improve his ENOC via weight loss.   For his lung cancer screening, his pulmonary nodules are stable. We discussed enrollment in a biomarker clinical trial, and he is interested.  1. Pre-COPD: combined assessment category A 2. Mild emphysema 3. Tobacco smoking 4. Snoring 5. Obesity 6. RUL pulmonary nodule, 3 mm  -encouraged regular physical activity and weight loss -discussed the benefits of tobacco cessation; he has NRT at home -A1AT; previously declined -Sleep study was not scheduled; he will consider -LCS: due in 2/2025 -Vaccinations: PCV-20 12/5/23, Influenza 11/2023 -follow-up with me in 6 months with 6MWT and PFTs

## 2024-03-18 NOTE — CONSULT LETTER
[Dear  ___] : Dear  [unfilled], [Consult Closing:] : Thank you very much for allowing me to participate in the care of this patient.  If you have any questions, please do not hesitate to contact me. [Courtesy Letter:] : I had the pleasure of seeing your patient, [unfilled], in my office today. [Sincerely,] : Sincerely, [FreeTextEntry2] : Jonah Wiley [FreeTextEntry3] : Tee Benites [DrYobany  ___] : Dr. PEREZ [FreeTextEntry1] : Dr. Wiley- I saw Mr. Canela today. He is feeling well. He has found a support group that is helping with his grief. He has not pursued the sleep study since he has tried CPAP in the past and had difficulty tolerating it. PRN Albuterol is helping. We discussed regular physical activity, smoking cessation, and weight loss. I will see him back in 6 months. \par  \par  Please call me with additional thoughts.\par  \par  Tee Benites\par  C: [phone]

## 2024-04-25 NOTE — ADDENDUM
Airway       Patient location during procedure: OR       Procedure Start/Stop Times: 4/25/2024 7:54 AM  Staff -        Anesthesiologist:  Yuli Ray MD       CRNA: Fifi Chaparro       Performed By: SRNA  Consent for Airway        Urgency: elective  Indications and Patient Condition       Indications for airway management: selina-procedural       Induction type:intravenous       Mask difficulty assessment: 1 - vent by mask    Final Airway Details       Final airway type: endotracheal airway       Successful airway: ETT - single  Endotracheal Airway Details        ETT size (mm): 7.5       Cuffed: yes       Successful intubation technique: direct laryngoscopy       DL Blade Type: [place] 2       Grade View of Cords: 1       Adjucts: stylet       Position: Right       Measured from: lips       Secured at (cm): 21       Bite block used: None    Post intubation assessment        Placement verified by: capnometry        Number of attempts at approach: 1       Secured with: tape       Ease of procedure: easy       Dentition: Intact and Unchanged    Medication(s) Administered   Medication Administration Time: 4/25/2024 7:54 AM         [FreeTextEntry1] : This note was authored by Kalpana Herrera working as a scribe for Dr.Gary Leggett. I, Dr. Chris Leggett have reviewed the content of this note and confirm it is true and accurate. I personally performed the history and physical examination and made all the decisions 11/11/2021.

## 2024-05-20 ENCOUNTER — APPOINTMENT (OUTPATIENT)
Dept: INTERNAL MEDICINE | Facility: CLINIC | Age: 64
End: 2024-05-20
Payer: COMMERCIAL

## 2024-05-20 VITALS
BODY MASS INDEX: 33.59 KG/M2 | HEIGHT: 72 IN | OXYGEN SATURATION: 96 % | HEART RATE: 88 BPM | DIASTOLIC BLOOD PRESSURE: 77 MMHG | WEIGHT: 248 LBS | SYSTOLIC BLOOD PRESSURE: 144 MMHG

## 2024-05-20 PROCEDURE — 99214 OFFICE O/P EST MOD 30 MIN: CPT

## 2024-05-20 PROCEDURE — G2211 COMPLEX E/M VISIT ADD ON: CPT | Mod: NC,1L

## 2024-05-20 RX ORDER — CLOBETASOL PROPIONATE 0.5 MG/G
0.05 CREAM TOPICAL
Qty: 60 | Refills: 2 | Status: DISCONTINUED | COMMUNITY
Start: 2024-02-06 | End: 2024-05-20

## 2024-05-21 NOTE — HISTORY OF PRESENT ILLNESS
[de-identified] : 63 year old male smoker with history of hypertension, depression, low T presents for followup.    Overall doing okay.  No acute issues.  Mainly here to discuss labs done several months ago.   seems high strung at times.  was on zoloft after the death of his fiance.  feels like he is okay without meds CT coronary reviewed, low Ca score.  cont medical management  +smoker, 1/2 PPD. Ct chest lung- small pulm nodules.  follows with pulm.   colonoscopy in 2022.  one benign polyp. done by Dr. Calvert.   h/o vocal chord nodule.  being followed by ENT.  no intervention needed at this time.  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

## 2024-05-21 NOTE — ASSESSMENT
[FreeTextEntry1] : //  Hypertension, controlled  -cont losartan as directed  -focus on weight loss and smoking cessation  Hyperlipidemia last LDL-98 -Will check labs  -Advised decrease greasy, fatty foods, increase exercise, fiber intake  -Elevated cholesterol has been linked to increase in cardiovascular even such as heart attack, stroke, peripheral artery disease and death  Pulm nodules, follows with pulm  -annual LD CT chest   Nicotine Dependence, smokes 1/2 PPD  -cont LDCT chest -discussed multiple options to quit smoking such as nicotine replacements, medications and group therapy, advised to pick a quit date and strive to achieve complete cessation, informational phone numbers, programs and educational materiel given to patient, stressed importance to quit as to reduce risk of cancer, cardiovascular event and premature death.   -Patient is a smoker, smoking cessation was strongly encouraged.  Patient was advised that smoking cessation lowers risks for lung and other types of cancer, reduces the risk of coronary heart disease, stroke and peripheral vascular disease and reduces the risk of developing chronic obstructive pulmonary disease (COPD).  Overall, smoking can cause serious health issues and death.   -It is important that you stop smoking.  Advised to pick a quit date and adhere to therapy.  If you need help to quit smoking call the [facility] Smokers Quitline at 1-774.152.9321 for additional information and nicotine replacement therapy   time spent on education 5 minutes.  CVD, CT coronary angio in 2023- nonobstructive epicardial CAD, calcium score-39.  decreased DP pulses bilaterally.  US art LE- normal, carotid doppler- normal  -cont statin,   BPH, follows with urology  -cont tadalafil 5 mg once daily, tamsulosin 0.4 mg once daily, and alfuzosin ER 10 mg once daily. -f/u as per uro  Obesity, no major change over the last several months  -Being overweight increases your risk of health conditions such as heart disease, high blood pressure, type 2 diabetes, and certain types of cancer. It can also increase your risk for osteoarthritis, sleep apnea, and other respiratory problems. Aim for a slow, steady weight loss. Even a small amount of weight loss can lower your risk of health problems. -A safe and healthy way to lose weight is to eat fewer calories and get regular exercise. You can lose up about 1 pound a week by decreasing the number of calories you eat by 500 calories each day. You can decrease calories by eating smaller portion sizes or by cutting out high-calorie foods. Read labels to find out how many calories are in the foods you eat. You can also burn calories with exercise such as walking, swimming, or biking. You will be more likely to keep weight off if you make these changes part of your lifestyle. -Exercise at least 30 minutes per day on most days of the week. Some examples of exercise include walking, biking, dancing, and swimming. You can also fit in more physical activity by taking the stairs instead of the elevator or parking farther away from stores.   f/u in 3 months

## 2024-06-26 NOTE — PHYSICAL EXAM
Returned call and spoke with pharmacist at [facility]. All questions answered and clarified regarding patients lantus solostar.   [TextBox_11] : NC/AT

## 2024-09-10 ENCOUNTER — APPOINTMENT (OUTPATIENT)
Dept: INTERNAL MEDICINE | Facility: CLINIC | Age: 64
End: 2024-09-10
Payer: COMMERCIAL

## 2024-09-10 VITALS
HEART RATE: 79 BPM | OXYGEN SATURATION: 98 % | DIASTOLIC BLOOD PRESSURE: 74 MMHG | BODY MASS INDEX: 32.91 KG/M2 | WEIGHT: 243 LBS | HEIGHT: 72 IN | SYSTOLIC BLOOD PRESSURE: 117 MMHG

## 2024-09-10 DIAGNOSIS — E22.1 HYPERPROLACTINEMIA: ICD-10-CM

## 2024-09-10 DIAGNOSIS — Z86.39 PERSONAL HISTORY OF OTHER ENDOCRINE, NUTRITIONAL AND METABOLIC DISEASE: ICD-10-CM

## 2024-09-10 DIAGNOSIS — E66.9 OBESITY, UNSPECIFIED: ICD-10-CM

## 2024-09-10 DIAGNOSIS — F17.210 NICOTINE DEPENDENCE, CIGARETTES, UNCOMPLICATED: ICD-10-CM

## 2024-09-10 PROCEDURE — 99214 OFFICE O/P EST MOD 30 MIN: CPT

## 2024-09-10 PROCEDURE — G2211 COMPLEX E/M VISIT ADD ON: CPT | Mod: NC

## 2024-09-10 RX ORDER — BIOTIN 1 MG
TABLET ORAL
Refills: 0 | Status: ACTIVE | COMMUNITY

## 2024-09-10 NOTE — ASSESSMENT
[FreeTextEntry1] : //  Hypertension, controlled  -continue losartan as directed  -focus on weight loss and smoking cessation  Hyperlipidemia last LDL-98 -Will check labs  -Advised decrease greasy, fatty foods, increase exercise, fiber intake  -Elevated cholesterol has been linked to increase in cardiovascular even such as heart attack, stroke, peripheral artery disease and death  Pulm nodules, follows with pulm  -annual LD CT chest   Nicotine Dependence, smokes 1/2 PPD  -continue LDCT chest -discussed multiple options to quit smoking such as nicotine replacements, medications and group therapy, advised to pick a quit date and strive to achieve complete cessation, informational phone numbers, programs and educational materiel given to patient, stressed importance to quit as to reduce risk of cancer, cardiovascular event and premature death.   -Patient is a smoker, smoking cessation was strongly encouraged.  Patient was advised that smoking cessation lowers risks for lung and other types of cancer, reduces the risk of coronary heart disease, stroke and peripheral vascular disease and reduces the risk of developing chronic obstructive pulmonary disease (COPD).  Overall, smoking can cause serious health issues and death.   -It is important that you stop smoking.  Advised to pick a quit date and adhere to therapy.  If you need help to quit smoking call the [facility] Smokers Quitline at 1-717.704.1695 for additional information and nicotine replacement therapy   time spent on education 5 minutes.  CVD, CT coronary angio in 2023- nonobstructive epicardial CAD, calcium score-39.  decreased DP pulses bilaterally.  US art LE- normal, carotid doppler- normal  -continue statin,   BPH, follows with urology  -continue tadalafil 5 mg once daily, tamsulosin 0.4 mg once daily, and alfuzosin ER 10 mg once daily. -f/u as per uro  Obesity, no major change over the last several months  -Being overweight increases your risk of health conditions such as heart disease, high blood pressure, type 2 diabetes, and certain types of cancer. It can also increase your risk for osteoarthritis, sleep apnea, and other respiratory problems. Aim for a slow, steady weight loss. Even a small amount of weight loss can lower your risk of health problems. -A safe and healthy way to lose weight is to eat fewer calories and get regular exercise. You can lose up about 1 pound a week by decreasing the number of calories you eat by 500 calories each day. You can decrease calories by eating smaller portion sizes or by cutting out high-calorie foods. Read labels to find out how many calories are in the foods you eat. You can also burn calories with exercise such as walking, swimming, or biking. You will be more likely to keep weight off if you make these changes part of your lifestyle. -Exercise at least 30 minutes per day on most days of the week. Some examples of exercise include walking, biking, dancing, and swimming. You can also fit in more physical activity by taking the stairs instead of the elevator or parking farther away from stores.   f/u in 3 months

## 2024-09-10 NOTE — HISTORY OF PRESENT ILLNESS
[de-identified] : 63 year old male smoker with history of hypertension, depression, low T presents for followup.    has been speaking to therapist once weekly.  feels like its been helping.  not interested in starting meds.      2/2024: seems high strung at times.  was on zoloft after the death of his fiance.  feels like he is okay without meds CT coronary reviewed, low Ca score.  continue medical management  +smoker, 1/2 PPD. Ct chest lung- small pulm nodules.  follows with pulm.   colonoscopy in 2022.  one benign polyp. done by Dr. Calvert.   h/o vocal chord nodule.  being followed by ENT.  no intervention needed at this time.  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

## 2024-09-10 NOTE — HISTORY OF PRESENT ILLNESS
[de-identified] : 63 year old male smoker with history of hypertension, depression, low T presents for followup.    has been speaking to therapist once weekly.  feels like its been helping.  not interested in starting meds.      2/2024: seems high strung at times.  was on zoloft after the death of his fiance.  feels like he is okay without meds CT coronary reviewed, low Ca score.  continue medical management  +smoker, 1/2 PPD. Ct chest lung- small pulm nodules.  follows with pulm.   colonoscopy in 2022.  one benign polyp. done by Dr. Calvert.   h/o vocal chord nodule.  being followed by ENT.  no intervention needed at this time.  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

## 2024-09-10 NOTE — ASSESSMENT
[FreeTextEntry1] : //  Hypertension, controlled  -continue losartan as directed  -focus on weight loss and smoking cessation  Hyperlipidemia last LDL-98 -Will check labs  -Advised decrease greasy, fatty foods, increase exercise, fiber intake  -Elevated cholesterol has been linked to increase in cardiovascular even such as heart attack, stroke, peripheral artery disease and death  Pulm nodules, follows with pulm  -annual LD CT chest   Nicotine Dependence, smokes 1/2 PPD  -continue LDCT chest -discussed multiple options to quit smoking such as nicotine replacements, medications and group therapy, advised to pick a quit date and strive to achieve complete cessation, informational phone numbers, programs and educational materiel given to patient, stressed importance to quit as to reduce risk of cancer, cardiovascular event and premature death.   -Patient is a smoker, smoking cessation was strongly encouraged.  Patient was advised that smoking cessation lowers risks for lung and other types of cancer, reduces the risk of coronary heart disease, stroke and peripheral vascular disease and reduces the risk of developing chronic obstructive pulmonary disease (COPD).  Overall, smoking can cause serious health issues and death.   -It is important that you stop smoking.  Advised to pick a quit date and adhere to therapy.  If you need help to quit smoking call the [facility] Smokers Quitline at 1-564.139.9873 for additional information and nicotine replacement therapy   time spent on education 5 minutes.  CVD, CT coronary angio in 2023- nonobstructive epicardial CAD, calcium score-39.  decreased DP pulses bilaterally.  US art LE- normal, carotid doppler- normal  -continue statin,   BPH, follows with urology  -continue tadalafil 5 mg once daily, tamsulosin 0.4 mg once daily, and alfuzosin ER 10 mg once daily. -f/u as per uro  Obesity, no major change over the last several months  -Being overweight increases your risk of health conditions such as heart disease, high blood pressure, type 2 diabetes, and certain types of cancer. It can also increase your risk for osteoarthritis, sleep apnea, and other respiratory problems. Aim for a slow, steady weight loss. Even a small amount of weight loss can lower your risk of health problems. -A safe and healthy way to lose weight is to eat fewer calories and get regular exercise. You can lose up about 1 pound a week by decreasing the number of calories you eat by 500 calories each day. You can decrease calories by eating smaller portion sizes or by cutting out high-calorie foods. Read labels to find out how many calories are in the foods you eat. You can also burn calories with exercise such as walking, swimming, or biking. You will be more likely to keep weight off if you make these changes part of your lifestyle. -Exercise at least 30 minutes per day on most days of the week. Some examples of exercise include walking, biking, dancing, and swimming. You can also fit in more physical activity by taking the stairs instead of the elevator or parking farther away from stores.   f/u in 3 months

## 2024-11-11 ENCOUNTER — APPOINTMENT (OUTPATIENT)
Dept: PULMONOLOGY | Facility: CLINIC | Age: 64
End: 2024-11-11
Payer: COMMERCIAL

## 2024-11-11 VITALS
SYSTOLIC BLOOD PRESSURE: 126 MMHG | DIASTOLIC BLOOD PRESSURE: 81 MMHG | HEART RATE: 85 BPM | BODY MASS INDEX: 35.84 KG/M2 | WEIGHT: 242 LBS | HEIGHT: 69 IN

## 2024-11-11 PROCEDURE — 94060 EVALUATION OF WHEEZING: CPT

## 2024-11-11 PROCEDURE — ZZZZZ: CPT

## 2024-11-11 PROCEDURE — 94726 PLETHYSMOGRAPHY LUNG VOLUMES: CPT

## 2024-11-11 PROCEDURE — 99406 BEHAV CHNG SMOKING 3-10 MIN: CPT

## 2024-11-11 PROCEDURE — 94618 PULMONARY STRESS TESTING: CPT

## 2024-11-11 PROCEDURE — 99213 OFFICE O/P EST LOW 20 MIN: CPT | Mod: 25

## 2024-11-11 PROCEDURE — 94729 DIFFUSING CAPACITY: CPT

## 2024-12-10 ENCOUNTER — APPOINTMENT (OUTPATIENT)
Dept: UROLOGY | Facility: CLINIC | Age: 64
End: 2024-12-10

## 2024-12-10 DIAGNOSIS — R53.83 OTHER MALAISE: ICD-10-CM

## 2024-12-10 DIAGNOSIS — R35.0 FREQUENCY OF MICTURITION: ICD-10-CM

## 2024-12-10 DIAGNOSIS — R97.20 ELEVATED PROSTATE, SPECIFIC ANTIGEN [PSA]: ICD-10-CM

## 2024-12-10 DIAGNOSIS — E28.0 ESTROGEN EXCESS: ICD-10-CM

## 2024-12-10 DIAGNOSIS — N52.1 ERECTILE DYSFUNCTION DUE TO DISEASES CLASSIFIED ELSEWHERE: ICD-10-CM

## 2024-12-10 DIAGNOSIS — R82.89 OTHER ABNRM FNDNGS ON CYTOLOGICAL: ICD-10-CM

## 2024-12-10 DIAGNOSIS — R79.89 OTHER SPECIFIED ABNORMAL FINDINGS OF BLOOD CHEMISTRY: ICD-10-CM

## 2024-12-10 DIAGNOSIS — R53.81 OTHER MALAISE: ICD-10-CM

## 2024-12-10 DIAGNOSIS — N13.8 BENIGN PROSTATIC HYPERPLASIA WITH LOWER URINARY TRACT SYMPMS: ICD-10-CM

## 2024-12-10 DIAGNOSIS — N40.1 BENIGN PROSTATIC HYPERPLASIA WITH LOWER URINARY TRACT SYMPMS: ICD-10-CM

## 2024-12-10 PROCEDURE — 99443: CPT

## 2024-12-10 RX ORDER — DUTASTERIDE 0.5 MG/1
0.5 CAPSULE, LIQUID FILLED ORAL
Qty: 30 | Refills: 11 | Status: ACTIVE | COMMUNITY
Start: 2024-12-10 | End: 1900-01-01

## 2024-12-16 ENCOUNTER — APPOINTMENT (OUTPATIENT)
Dept: UROLOGY | Facility: CLINIC | Age: 64
End: 2024-12-16

## 2024-12-19 ENCOUNTER — APPOINTMENT (OUTPATIENT)
Dept: OTOLARYNGOLOGY | Facility: CLINIC | Age: 64
End: 2024-12-19
Payer: COMMERCIAL

## 2024-12-19 VITALS — WEIGHT: 242 LBS | BODY MASS INDEX: 35.84 KG/M2 | HEIGHT: 69 IN

## 2024-12-19 DIAGNOSIS — J34.2 DEVIATED NASAL SEPTUM: ICD-10-CM

## 2024-12-19 DIAGNOSIS — J31.0 CHRONIC RHINITIS: ICD-10-CM

## 2024-12-19 DIAGNOSIS — J38.2 NODULES OF VOCAL CORDS: ICD-10-CM

## 2024-12-19 DIAGNOSIS — H90.3 SENSORINEURAL HEARING LOSS, BILATERAL: ICD-10-CM

## 2024-12-19 DIAGNOSIS — H61.23 IMPACTED CERUMEN, BILATERAL: ICD-10-CM

## 2024-12-19 PROCEDURE — 69210 REMOVE IMPACTED EAR WAX UNI: CPT

## 2024-12-19 PROCEDURE — 31575 DIAGNOSTIC LARYNGOSCOPY: CPT

## 2024-12-19 PROCEDURE — 99213 OFFICE O/P EST LOW 20 MIN: CPT | Mod: 25

## 2024-12-30 ENCOUNTER — TRANSCRIPTION ENCOUNTER (OUTPATIENT)
Age: 64
End: 2024-12-30

## 2025-01-09 ENCOUNTER — TRANSCRIPTION ENCOUNTER (OUTPATIENT)
Age: 65
End: 2025-01-09

## 2025-01-28 ENCOUNTER — APPOINTMENT (OUTPATIENT)
Dept: UROLOGY | Facility: CLINIC | Age: 65
End: 2025-01-28
Payer: COMMERCIAL

## 2025-01-28 ENCOUNTER — RX RENEWAL (OUTPATIENT)
Age: 65
End: 2025-01-28

## 2025-01-28 ENCOUNTER — NON-APPOINTMENT (OUTPATIENT)
Age: 65
End: 2025-01-28

## 2025-01-28 DIAGNOSIS — E22.1 HYPERPROLACTINEMIA: ICD-10-CM

## 2025-01-28 DIAGNOSIS — R33.9 RETENTION OF URINE, UNSPECIFIED: ICD-10-CM

## 2025-01-28 DIAGNOSIS — R35.0 FREQUENCY OF MICTURITION: ICD-10-CM

## 2025-01-28 DIAGNOSIS — N52.1 ERECTILE DYSFUNCTION DUE TO DISEASES CLASSIFIED ELSEWHERE: ICD-10-CM

## 2025-01-28 DIAGNOSIS — R53.81 OTHER MALAISE: ICD-10-CM

## 2025-01-28 DIAGNOSIS — N13.8 BENIGN PROSTATIC HYPERPLASIA WITH LOWER URINARY TRACT SYMPMS: ICD-10-CM

## 2025-01-28 DIAGNOSIS — F17.200 NICOTINE DEPENDENCE, UNSPECIFIED, UNCOMPLICATED: ICD-10-CM

## 2025-01-28 DIAGNOSIS — E28.0 ESTROGEN EXCESS: ICD-10-CM

## 2025-01-28 DIAGNOSIS — R97.20 ELEVATED PROSTATE, SPECIFIC ANTIGEN [PSA]: ICD-10-CM

## 2025-01-28 DIAGNOSIS — R39.9 UNSPECIFIED SYMPTOMS AND SIGNS INVOLVING THE GENITOURINARY SYSTEM: ICD-10-CM

## 2025-01-28 DIAGNOSIS — N40.1 BENIGN PROSTATIC HYPERPLASIA WITH LOWER URINARY TRACT SYMPMS: ICD-10-CM

## 2025-01-28 DIAGNOSIS — R79.89 OTHER SPECIFIED ABNORMAL FINDINGS OF BLOOD CHEMISTRY: ICD-10-CM

## 2025-01-28 DIAGNOSIS — R53.83 OTHER MALAISE: ICD-10-CM

## 2025-01-28 DIAGNOSIS — Z80.42 FAMILY HISTORY OF MALIGNANT NEOPLASM OF PROSTATE: ICD-10-CM

## 2025-01-28 PROCEDURE — 99215 OFFICE O/P EST HI 40 MIN: CPT | Mod: 93

## 2025-01-28 RX ORDER — TADALAFIL 5 MG/1
5 TABLET ORAL
Qty: 90 | Refills: 3 | Status: ACTIVE | COMMUNITY
Start: 2025-01-28 | End: 1900-01-01

## 2025-02-11 ENCOUNTER — APPOINTMENT (OUTPATIENT)
Dept: INTERNAL MEDICINE | Facility: CLINIC | Age: 65
End: 2025-02-11
Payer: COMMERCIAL

## 2025-02-11 VITALS
TEMPERATURE: 97.9 F | OXYGEN SATURATION: 96 % | HEIGHT: 72 IN | DIASTOLIC BLOOD PRESSURE: 69 MMHG | HEART RATE: 81 BPM | SYSTOLIC BLOOD PRESSURE: 118 MMHG

## 2025-02-11 VITALS — BODY MASS INDEX: 32.96 KG/M2 | WEIGHT: 243 LBS

## 2025-02-11 DIAGNOSIS — F17.210 NICOTINE DEPENDENCE, CIGARETTES, UNCOMPLICATED: ICD-10-CM

## 2025-02-11 DIAGNOSIS — J32.9 CHRONIC SINUSITIS, UNSPECIFIED: ICD-10-CM

## 2025-02-11 DIAGNOSIS — J31.0 CHRONIC RHINITIS: ICD-10-CM

## 2025-02-11 DIAGNOSIS — E66.9 OBESITY, UNSPECIFIED: ICD-10-CM

## 2025-02-11 PROCEDURE — 99214 OFFICE O/P EST MOD 30 MIN: CPT

## 2025-02-11 PROCEDURE — G2211 COMPLEX E/M VISIT ADD ON: CPT | Mod: NC

## 2025-02-11 RX ORDER — AMOXICILLIN AND CLAVULANATE POTASSIUM 875; 125 MG/1; MG/1
875-125 TABLET, COATED ORAL TWICE DAILY
Qty: 14 | Refills: 0 | Status: ACTIVE | COMMUNITY
Start: 2025-02-11 | End: 1900-01-01

## 2025-02-19 ENCOUNTER — NON-APPOINTMENT (OUTPATIENT)
Age: 65
End: 2025-02-19

## 2025-02-19 VITALS — WEIGHT: 243 LBS | BODY MASS INDEX: 32.91 KG/M2 | HEIGHT: 72 IN

## 2025-02-19 DIAGNOSIS — F17.200 NICOTINE DEPENDENCE, UNSPECIFIED, UNCOMPLICATED: ICD-10-CM

## 2025-03-11 ENCOUNTER — APPOINTMENT (OUTPATIENT)
Dept: UROLOGY | Facility: CLINIC | Age: 65
End: 2025-03-11
Payer: COMMERCIAL

## 2025-03-11 DIAGNOSIS — N40.1 BENIGN PROSTATIC HYPERPLASIA WITH LOWER URINARY TRACT SYMPMS: ICD-10-CM

## 2025-03-11 DIAGNOSIS — R33.9 RETENTION OF URINE, UNSPECIFIED: ICD-10-CM

## 2025-03-11 DIAGNOSIS — R35.0 FREQUENCY OF MICTURITION: ICD-10-CM

## 2025-03-11 DIAGNOSIS — N13.8 BENIGN PROSTATIC HYPERPLASIA WITH LOWER URINARY TRACT SYMPMS: ICD-10-CM

## 2025-03-11 DIAGNOSIS — N52.1 ERECTILE DYSFUNCTION DUE TO DISEASES CLASSIFIED ELSEWHERE: ICD-10-CM

## 2025-03-11 PROCEDURE — 99214 OFFICE O/P EST MOD 30 MIN: CPT

## 2025-03-26 ENCOUNTER — APPOINTMENT (OUTPATIENT)
Dept: ULTRASOUND IMAGING | Facility: IMAGING CENTER | Age: 65
End: 2025-03-26
Payer: COMMERCIAL

## 2025-03-26 ENCOUNTER — RESULT REVIEW (OUTPATIENT)
Age: 65
End: 2025-03-26

## 2025-03-26 ENCOUNTER — APPOINTMENT (OUTPATIENT)
Dept: INTERNAL MEDICINE | Facility: CLINIC | Age: 65
End: 2025-03-26
Payer: COMMERCIAL

## 2025-03-26 ENCOUNTER — OUTPATIENT (OUTPATIENT)
Dept: OUTPATIENT SERVICES | Facility: HOSPITAL | Age: 65
LOS: 1 days | End: 2025-03-26
Payer: COMMERCIAL

## 2025-03-26 VITALS
WEIGHT: 248 LBS | RESPIRATION RATE: 16 BRPM | BODY MASS INDEX: 33.59 KG/M2 | DIASTOLIC BLOOD PRESSURE: 67 MMHG | HEART RATE: 85 BPM | HEIGHT: 72 IN | SYSTOLIC BLOOD PRESSURE: 122 MMHG | OXYGEN SATURATION: 97 %

## 2025-03-26 DIAGNOSIS — Z86.39 PERSONAL HISTORY OF OTHER ENDOCRINE, NUTRITIONAL AND METABOLIC DISEASE: ICD-10-CM

## 2025-03-26 DIAGNOSIS — Z00.00 ENCOUNTER FOR GENERAL ADULT MEDICAL EXAMINATION W/OUT ABNORMAL FINDINGS: ICD-10-CM

## 2025-03-26 DIAGNOSIS — R35.0 FREQUENCY OF MICTURITION: ICD-10-CM

## 2025-03-26 DIAGNOSIS — E22.1 HYPERPROLACTINEMIA: ICD-10-CM

## 2025-03-26 PROCEDURE — 76770 US EXAM ABDO BACK WALL COMP: CPT

## 2025-03-26 PROCEDURE — 76857 US EXAM PELVIC LIMITED: CPT | Mod: 26

## 2025-03-26 PROCEDURE — 76857 US EXAM PELVIC LIMITED: CPT

## 2025-03-26 PROCEDURE — 99396 PREV VISIT EST AGE 40-64: CPT

## 2025-03-26 PROCEDURE — 76770 US EXAM ABDO BACK WALL COMP: CPT | Mod: 26

## 2025-04-03 ENCOUNTER — APPOINTMENT (OUTPATIENT)
Dept: CT IMAGING | Facility: IMAGING CENTER | Age: 65
End: 2025-04-03

## 2025-04-15 ENCOUNTER — APPOINTMENT (OUTPATIENT)
Dept: UROLOGY | Facility: CLINIC | Age: 65
End: 2025-04-15
Payer: COMMERCIAL

## 2025-04-15 DIAGNOSIS — R82.89 OTHER ABNRM FNDNGS ON CYTOLOGICAL: ICD-10-CM

## 2025-04-15 DIAGNOSIS — E22.1 HYPERPROLACTINEMIA: ICD-10-CM

## 2025-04-15 DIAGNOSIS — R35.0 FREQUENCY OF MICTURITION: ICD-10-CM

## 2025-04-15 DIAGNOSIS — F17.210 NICOTINE DEPENDENCE, CIGARETTES, UNCOMPLICATED: ICD-10-CM

## 2025-04-15 DIAGNOSIS — N13.8 BENIGN PROSTATIC HYPERPLASIA WITH LOWER URINARY TRACT SYMPMS: ICD-10-CM

## 2025-04-15 DIAGNOSIS — R53.81 OTHER MALAISE: ICD-10-CM

## 2025-04-15 DIAGNOSIS — N40.1 BENIGN PROSTATIC HYPERPLASIA WITH LOWER URINARY TRACT SYMPMS: ICD-10-CM

## 2025-04-15 DIAGNOSIS — R53.83 OTHER MALAISE: ICD-10-CM

## 2025-04-15 DIAGNOSIS — R79.89 OTHER SPECIFIED ABNORMAL FINDINGS OF BLOOD CHEMISTRY: ICD-10-CM

## 2025-04-15 DIAGNOSIS — N52.1 ERECTILE DYSFUNCTION DUE TO DISEASES CLASSIFIED ELSEWHERE: ICD-10-CM

## 2025-04-15 DIAGNOSIS — R97.20 ELEVATED PROSTATE, SPECIFIC ANTIGEN [PSA]: ICD-10-CM

## 2025-04-15 PROCEDURE — 99215 OFFICE O/P EST HI 40 MIN: CPT | Mod: 95

## 2025-04-15 PROCEDURE — 99417 PROLNG OP E/M EACH 15 MIN: CPT | Mod: 95

## 2025-04-16 ENCOUNTER — OUTPATIENT (OUTPATIENT)
Dept: OUTPATIENT SERVICES | Facility: HOSPITAL | Age: 65
LOS: 1 days | End: 2025-04-16
Payer: COMMERCIAL

## 2025-04-16 ENCOUNTER — APPOINTMENT (OUTPATIENT)
Dept: CT IMAGING | Facility: IMAGING CENTER | Age: 65
End: 2025-04-16
Payer: COMMERCIAL

## 2025-04-16 DIAGNOSIS — Z12.2 ENCOUNTER FOR SCREENING FOR MALIGNANT NEOPLASM OF RESPIRATORY ORGANS: ICD-10-CM

## 2025-04-16 PROCEDURE — 71271 CT THORAX LUNG CANCER SCR C-: CPT | Mod: 26

## 2025-04-16 PROCEDURE — 71271 CT THORAX LUNG CANCER SCR C-: CPT

## 2025-05-05 ENCOUNTER — APPOINTMENT (OUTPATIENT)
Dept: PULMONOLOGY | Facility: CLINIC | Age: 65
End: 2025-05-05
Payer: COMMERCIAL

## 2025-05-05 VITALS
DIASTOLIC BLOOD PRESSURE: 79 MMHG | WEIGHT: 248 LBS | HEIGHT: 72 IN | OXYGEN SATURATION: 97 % | RESPIRATION RATE: 16 BRPM | HEART RATE: 82 BPM | BODY MASS INDEX: 33.59 KG/M2 | SYSTOLIC BLOOD PRESSURE: 128 MMHG | TEMPERATURE: 97.6 F

## 2025-05-05 PROCEDURE — 99406 BEHAV CHNG SMOKING 3-10 MIN: CPT

## 2025-05-05 PROCEDURE — 99214 OFFICE O/P EST MOD 30 MIN: CPT

## 2025-06-26 ENCOUNTER — APPOINTMENT (OUTPATIENT)
Dept: OTOLARYNGOLOGY | Facility: CLINIC | Age: 65
End: 2025-06-26
Payer: COMMERCIAL

## 2025-06-26 VITALS
BODY MASS INDEX: 33.59 KG/M2 | HEIGHT: 72 IN | HEART RATE: 85 BPM | SYSTOLIC BLOOD PRESSURE: 118 MMHG | DIASTOLIC BLOOD PRESSURE: 72 MMHG | WEIGHT: 248 LBS

## 2025-06-26 PROCEDURE — 31575 DIAGNOSTIC LARYNGOSCOPY: CPT

## 2025-06-26 PROCEDURE — 69210 REMOVE IMPACTED EAR WAX UNI: CPT

## 2025-06-26 PROCEDURE — 99213 OFFICE O/P EST LOW 20 MIN: CPT | Mod: 25
